# Patient Record
Sex: FEMALE | Race: BLACK OR AFRICAN AMERICAN | Employment: OTHER | URBAN - NONMETROPOLITAN AREA
[De-identification: names, ages, dates, MRNs, and addresses within clinical notes are randomized per-mention and may not be internally consistent; named-entity substitution may affect disease eponyms.]

---

## 2017-05-24 LAB
ALBUMIN SERPL-MCNC: 4.1 G/DL
ALP BLD-CCNC: 31 U/L
ALT SERPL-CCNC: 16 U/L
AST SERPL-CCNC: 18 U/L
BASOPHILS ABSOLUTE: ABNORMAL /ΜL
BASOPHILS RELATIVE PERCENT: ABNORMAL %
BILIRUB SERPL-MCNC: 0.4 MG/DL (ref 0.1–1.4)
BILIRUBIN, URINE: NEGATIVE
BLOOD, URINE: NORMAL
BUN BLDV-MCNC: 19 MG/DL
CALCIUM SERPL-MCNC: 9 MG/DL
CHLORIDE BLD-SCNC: 102 MMOL/L
CHOLESTEROL, TOTAL: 167 MG/DL
CHOLESTEROL/HDL RATIO: 2.8
CLARITY: NORMAL
CO2: 31 MMOL/L
COLOR: YELLOW
CREAT SERPL-MCNC: 0.94 MG/DL
CREATININE, URINE: 126.2
EOSINOPHILS ABSOLUTE: ABNORMAL /ΜL
EOSINOPHILS RELATIVE PERCENT: ABNORMAL %
GFR CALCULATED: >60
GLUCOSE BLD-MCNC: 89 MG/DL
GLUCOSE URINE: NORMAL
HBA1C MFR BLD: 6.6 %
HCT VFR BLD CALC: 52.2 % (ref 36–46)
HDLC SERPL-MCNC: 59 MG/DL (ref 35–70)
HEMOGLOBIN: 16.9 G/DL (ref 12–16)
KETONES, URINE: NEGATIVE
LDL CHOLESTEROL CALCULATED: NORMAL MG/DL (ref 0–160)
LEUKOCYTE ESTERASE, URINE: NEGATIVE
LYMPHOCYTES ABSOLUTE: ABNORMAL /ΜL
LYMPHOCYTES RELATIVE PERCENT: ABNORMAL %
MCH RBC QN AUTO: ABNORMAL PG
MCHC RBC AUTO-ENTMCNC: ABNORMAL G/DL
MCV RBC AUTO: ABNORMAL FL
MICROALBUMIN/CREAT 24H UR: 1951.3 MG/G{CREAT}
MICROALBUMIN/CREAT UR-RTO: 1545.4
MONOCYTES ABSOLUTE: ABNORMAL /ΜL
MONOCYTES RELATIVE PERCENT: ABNORMAL %
NEUTROPHILS ABSOLUTE: ABNORMAL /ΜL
NEUTROPHILS RELATIVE PERCENT: ABNORMAL %
NITRITE, URINE: NEGATIVE
PDW BLD-RTO: ABNORMAL %
PH UA: 6 (ref 4.5–8)
PLATELET # BLD: 180 K/ΜL
PMV BLD AUTO: ABNORMAL FL
POTASSIUM SERPL-SCNC: 3.7 MMOL/L
PROTEIN UA: NORMAL
RBC # BLD: 5.75 10^6/ΜL
SODIUM BLD-SCNC: 139 MMOL/L
SPECIFIC GRAVITY, URINE: 1.02
TOTAL PROTEIN: 7.1
TRIGL SERPL-MCNC: 66 MG/DL
UROBILINOGEN, URINE: NORMAL
VLDLC SERPL CALC-MCNC: 13 MG/DL
WBC # BLD: 4.4 10^3/ML

## 2017-06-21 ENCOUNTER — OFFICE VISIT (OUTPATIENT)
Dept: ENDOCRINOLOGY | Age: 67
End: 2017-06-21

## 2017-06-21 VITALS
RESPIRATION RATE: 16 BRPM | HEIGHT: 59 IN | SYSTOLIC BLOOD PRESSURE: 146 MMHG | DIASTOLIC BLOOD PRESSURE: 88 MMHG | HEART RATE: 84 BPM

## 2017-06-21 DIAGNOSIS — I10 ESSENTIAL HYPERTENSION: ICD-10-CM

## 2017-06-21 DIAGNOSIS — E11.22 TYPE 2 DIABETES MELLITUS WITH STAGE 2 CHRONIC KIDNEY DISEASE, WITH LONG-TERM CURRENT USE OF INSULIN (HCC): Primary | ICD-10-CM

## 2017-06-21 DIAGNOSIS — N18.2 TYPE 2 DIABETES MELLITUS WITH STAGE 2 CHRONIC KIDNEY DISEASE, WITH LONG-TERM CURRENT USE OF INSULIN (HCC): Primary | ICD-10-CM

## 2017-06-21 DIAGNOSIS — Z79.4 TYPE 2 DIABETES MELLITUS WITH STAGE 2 CHRONIC KIDNEY DISEASE, WITH LONG-TERM CURRENT USE OF INSULIN (HCC): Primary | ICD-10-CM

## 2017-06-21 DIAGNOSIS — E78.5 DYSLIPIDEMIA: ICD-10-CM

## 2017-06-21 PROCEDURE — 3017F COLORECTAL CA SCREEN DOC REV: CPT | Performed by: INTERNAL MEDICINE

## 2017-06-21 PROCEDURE — 1036F TOBACCO NON-USER: CPT | Performed by: INTERNAL MEDICINE

## 2017-06-21 PROCEDURE — 3014F SCREEN MAMMO DOC REV: CPT | Performed by: INTERNAL MEDICINE

## 2017-06-21 PROCEDURE — 1090F PRES/ABSN URINE INCON ASSESS: CPT | Performed by: INTERNAL MEDICINE

## 2017-06-21 PROCEDURE — 3046F HEMOGLOBIN A1C LEVEL >9.0%: CPT | Performed by: INTERNAL MEDICINE

## 2017-06-21 PROCEDURE — 1123F ACP DISCUSS/DSCN MKR DOCD: CPT | Performed by: INTERNAL MEDICINE

## 2017-06-21 PROCEDURE — G8427 DOCREV CUR MEDS BY ELIG CLIN: HCPCS | Performed by: INTERNAL MEDICINE

## 2017-06-21 PROCEDURE — 4040F PNEUMOC VAC/ADMIN/RCVD: CPT | Performed by: INTERNAL MEDICINE

## 2017-06-21 PROCEDURE — G8421 BMI NOT CALCULATED: HCPCS | Performed by: INTERNAL MEDICINE

## 2017-06-21 PROCEDURE — 99204 OFFICE O/P NEW MOD 45 MIN: CPT | Performed by: INTERNAL MEDICINE

## 2017-06-21 PROCEDURE — G8400 PT W/DXA NO RESULTS DOC: HCPCS | Performed by: INTERNAL MEDICINE

## 2017-06-21 RX ORDER — ATORVASTATIN CALCIUM 10 MG/1
10 TABLET, FILM COATED ORAL DAILY
Qty: 30 TABLET | Refills: 3 | Status: SHIPPED | OUTPATIENT
Start: 2017-06-21 | End: 2018-03-07 | Stop reason: SDUPTHER

## 2017-06-21 ASSESSMENT — ENCOUNTER SYMPTOMS: BLURRED VISION: 1

## 2017-07-24 ENCOUNTER — TELEPHONE (OUTPATIENT)
Dept: PULMONOLOGY | Age: 67
End: 2017-07-24

## 2017-07-24 DIAGNOSIS — J43.1 PANLOBULAR EMPHYSEMA (HCC): Primary | ICD-10-CM

## 2017-08-29 LAB
ANION GAP SERPL CALCULATED.3IONS-SCNC: 7 MMOL/L (ref 4–12)
BUN BLDV-MCNC: 14 MG/DL (ref 7–20)
CALCIUM SERPL-MCNC: 8.7 MG/DL (ref 8.8–10.5)
CHLORIDE BLD-SCNC: 100 MEQ/L (ref 101–111)
CO2: 34 MEQ/L (ref 21–32)
CREAT SERPL-MCNC: 1.06 MG/DL (ref 0.6–1.3)
CREATININE CLEARANCE: >60
GLUCOSE: 98 MG/DL (ref 70–110)
POTASSIUM SERPL-SCNC: 3.7 MEQ/L (ref 3.6–5)
SODIUM BLD-SCNC: 141 MEQ/L (ref 135–145)

## 2017-09-01 ENCOUNTER — OFFICE VISIT (OUTPATIENT)
Dept: PULMONOLOGY | Age: 67
End: 2017-09-01
Payer: MEDICARE

## 2017-09-01 VITALS
WEIGHT: 200 LBS | HEIGHT: 59 IN | HEART RATE: 70 BPM | TEMPERATURE: 97.4 F | DIASTOLIC BLOOD PRESSURE: 92 MMHG | OXYGEN SATURATION: 92 % | BODY MASS INDEX: 40.32 KG/M2 | SYSTOLIC BLOOD PRESSURE: 144 MMHG

## 2017-09-01 DIAGNOSIS — J43.1 PANLOBULAR EMPHYSEMA (HCC): Primary | ICD-10-CM

## 2017-09-01 PROCEDURE — 1036F TOBACCO NON-USER: CPT | Performed by: INTERNAL MEDICINE

## 2017-09-01 PROCEDURE — G8926 SPIRO NO PERF OR DOC: HCPCS | Performed by: INTERNAL MEDICINE

## 2017-09-01 PROCEDURE — 4040F PNEUMOC VAC/ADMIN/RCVD: CPT | Performed by: INTERNAL MEDICINE

## 2017-09-01 PROCEDURE — 3017F COLORECTAL CA SCREEN DOC REV: CPT | Performed by: INTERNAL MEDICINE

## 2017-09-01 PROCEDURE — 1123F ACP DISCUSS/DSCN MKR DOCD: CPT | Performed by: INTERNAL MEDICINE

## 2017-09-01 PROCEDURE — G8427 DOCREV CUR MEDS BY ELIG CLIN: HCPCS | Performed by: INTERNAL MEDICINE

## 2017-09-01 PROCEDURE — 3014F SCREEN MAMMO DOC REV: CPT | Performed by: INTERNAL MEDICINE

## 2017-09-01 PROCEDURE — G8400 PT W/DXA NO RESULTS DOC: HCPCS | Performed by: INTERNAL MEDICINE

## 2017-09-01 PROCEDURE — 1090F PRES/ABSN URINE INCON ASSESS: CPT | Performed by: INTERNAL MEDICINE

## 2017-09-01 PROCEDURE — 3023F SPIROM DOC REV: CPT | Performed by: INTERNAL MEDICINE

## 2017-09-01 PROCEDURE — 99213 OFFICE O/P EST LOW 20 MIN: CPT | Performed by: INTERNAL MEDICINE

## 2017-09-01 PROCEDURE — G8417 CALC BMI ABV UP PARAM F/U: HCPCS | Performed by: INTERNAL MEDICINE

## 2017-09-01 ASSESSMENT — ENCOUNTER SYMPTOMS
SHORTNESS OF BREATH: 0
COUGH: 0
WHEEZING: 0
APNEA: 0

## 2017-09-05 ENCOUNTER — OFFICE VISIT (OUTPATIENT)
Dept: NEPHROLOGY | Age: 67
End: 2017-09-05
Payer: MEDICARE

## 2017-09-05 VITALS — HEART RATE: 72 BPM | RESPIRATION RATE: 18 BRPM | SYSTOLIC BLOOD PRESSURE: 132 MMHG | DIASTOLIC BLOOD PRESSURE: 90 MMHG

## 2017-09-05 DIAGNOSIS — E11.22 TYPE 2 DIABETES MELLITUS WITH STAGE 2 CHRONIC KIDNEY DISEASE, UNSPECIFIED LONG TERM INSULIN USE STATUS: ICD-10-CM

## 2017-09-05 DIAGNOSIS — N18.2 CKD (CHRONIC KIDNEY DISEASE), STAGE II: Primary | ICD-10-CM

## 2017-09-05 DIAGNOSIS — M15.9 PRIMARY OSTEOARTHRITIS INVOLVING MULTIPLE JOINTS: ICD-10-CM

## 2017-09-05 DIAGNOSIS — R80.1 PERSISTENT PROTEINURIA: ICD-10-CM

## 2017-09-05 DIAGNOSIS — I10 ESSENTIAL HYPERTENSION: ICD-10-CM

## 2017-09-05 DIAGNOSIS — N18.2 TYPE 2 DIABETES MELLITUS WITH STAGE 2 CHRONIC KIDNEY DISEASE, UNSPECIFIED LONG TERM INSULIN USE STATUS: ICD-10-CM

## 2017-09-05 PROCEDURE — G8417 CALC BMI ABV UP PARAM F/U: HCPCS | Performed by: INTERNAL MEDICINE

## 2017-09-05 PROCEDURE — 99213 OFFICE O/P EST LOW 20 MIN: CPT | Performed by: INTERNAL MEDICINE

## 2017-09-05 PROCEDURE — 1036F TOBACCO NON-USER: CPT | Performed by: INTERNAL MEDICINE

## 2017-09-05 PROCEDURE — G8400 PT W/DXA NO RESULTS DOC: HCPCS | Performed by: INTERNAL MEDICINE

## 2017-09-05 PROCEDURE — 3014F SCREEN MAMMO DOC REV: CPT | Performed by: INTERNAL MEDICINE

## 2017-09-05 PROCEDURE — 4040F PNEUMOC VAC/ADMIN/RCVD: CPT | Performed by: INTERNAL MEDICINE

## 2017-09-05 PROCEDURE — 3046F HEMOGLOBIN A1C LEVEL >9.0%: CPT | Performed by: INTERNAL MEDICINE

## 2017-09-05 PROCEDURE — 1090F PRES/ABSN URINE INCON ASSESS: CPT | Performed by: INTERNAL MEDICINE

## 2017-09-05 PROCEDURE — 3017F COLORECTAL CA SCREEN DOC REV: CPT | Performed by: INTERNAL MEDICINE

## 2017-09-05 PROCEDURE — 1123F ACP DISCUSS/DSCN MKR DOCD: CPT | Performed by: INTERNAL MEDICINE

## 2017-09-05 PROCEDURE — G8427 DOCREV CUR MEDS BY ELIG CLIN: HCPCS | Performed by: INTERNAL MEDICINE

## 2017-09-11 RX ORDER — EXENATIDE 250 UG/ML
10 INJECTION SUBCUTANEOUS 2 TIMES DAILY WITH MEALS
Qty: 7.2 ML | Refills: 1 | Status: SHIPPED | OUTPATIENT
Start: 2017-09-11 | End: 2018-03-07 | Stop reason: SDUPTHER

## 2017-09-27 ENCOUNTER — TELEPHONE (OUTPATIENT)
Dept: ENDOCRINOLOGY | Age: 67
End: 2017-09-27

## 2017-11-08 ENCOUNTER — OFFICE VISIT (OUTPATIENT)
Dept: ENDOCRINOLOGY | Age: 67
End: 2017-11-08
Payer: MEDICARE

## 2017-11-08 VITALS
DIASTOLIC BLOOD PRESSURE: 80 MMHG | RESPIRATION RATE: 16 BRPM | BODY MASS INDEX: 38.91 KG/M2 | WEIGHT: 193 LBS | HEIGHT: 59 IN | HEART RATE: 70 BPM | SYSTOLIC BLOOD PRESSURE: 142 MMHG

## 2017-11-08 DIAGNOSIS — Z79.4 TYPE 2 DIABETES MELLITUS WITH HYPERGLYCEMIA, WITH LONG-TERM CURRENT USE OF INSULIN (HCC): Primary | ICD-10-CM

## 2017-11-08 DIAGNOSIS — E11.65 TYPE 2 DIABETES MELLITUS WITH HYPERGLYCEMIA, WITH LONG-TERM CURRENT USE OF INSULIN (HCC): Primary | ICD-10-CM

## 2017-11-08 DIAGNOSIS — E03.9 ACQUIRED HYPOTHYROIDISM: ICD-10-CM

## 2017-11-08 DIAGNOSIS — I10 ESSENTIAL HYPERTENSION: ICD-10-CM

## 2017-11-08 DIAGNOSIS — R80.9 ALBUMINURIA: ICD-10-CM

## 2017-11-08 DIAGNOSIS — E78.00 PURE HYPERCHOLESTEROLEMIA: ICD-10-CM

## 2017-11-08 PROCEDURE — G8427 DOCREV CUR MEDS BY ELIG CLIN: HCPCS | Performed by: INTERNAL MEDICINE

## 2017-11-08 PROCEDURE — 1090F PRES/ABSN URINE INCON ASSESS: CPT | Performed by: INTERNAL MEDICINE

## 2017-11-08 PROCEDURE — G8417 CALC BMI ABV UP PARAM F/U: HCPCS | Performed by: INTERNAL MEDICINE

## 2017-11-08 PROCEDURE — 99214 OFFICE O/P EST MOD 30 MIN: CPT | Performed by: INTERNAL MEDICINE

## 2017-11-08 PROCEDURE — G8484 FLU IMMUNIZE NO ADMIN: HCPCS | Performed by: INTERNAL MEDICINE

## 2017-11-08 PROCEDURE — 1036F TOBACCO NON-USER: CPT | Performed by: INTERNAL MEDICINE

## 2017-11-08 PROCEDURE — G8400 PT W/DXA NO RESULTS DOC: HCPCS | Performed by: INTERNAL MEDICINE

## 2017-11-08 PROCEDURE — 4040F PNEUMOC VAC/ADMIN/RCVD: CPT | Performed by: INTERNAL MEDICINE

## 2017-11-08 PROCEDURE — 3017F COLORECTAL CA SCREEN DOC REV: CPT | Performed by: INTERNAL MEDICINE

## 2017-11-08 PROCEDURE — 3014F SCREEN MAMMO DOC REV: CPT | Performed by: INTERNAL MEDICINE

## 2017-11-08 PROCEDURE — 1123F ACP DISCUSS/DSCN MKR DOCD: CPT | Performed by: INTERNAL MEDICINE

## 2017-11-08 PROCEDURE — 3044F HG A1C LEVEL LT 7.0%: CPT | Performed by: INTERNAL MEDICINE

## 2017-11-08 NOTE — LETTER
Endocrine Diabetes Metabolism [facility]  [address] [facility]  [address]  Phone: [phone]  Fax: [phone]    Marni Winters MD      11/08/17    Dr. Nick Freeman    Patient: Mana Zamora  MR Number: [id number]  YOB: 1950  Date of Visit: 11/8/2017      Dear Dr. Nick Freeman    Thank you for the request for consultation for Mana Zamora to me for the evaluation of   Chief Complaint   Patient presents with    Diabetes     type 2    Foot Problem     denies any    Eye Problem     couple months ago    Other     dyslipidemia    Results     08/29/17   . Below are the relevant portions of my assessment and plan of care. Subjective:     69-year-old female comes for follow-up for type 2 diabetes mellitus, hyperlipidemia and hypertension. Her diabetes has been complicated by albuminuria. She was diagnosed with diabetes mellitus 4 years ago. Current therapy includes metformin, amaryl, byetta and lantus 22 qm. The patient does not keep track of calories but reports that she is on low starch foods. Physical activity is limited due to COPD and bad knees. Weight has declined since the last visit. The patient is checking blood sugar 1x/day . A1c is 6.6. Diabetic symptoms include: polyuria, dry mouth blurry vision due cataract. Most recent eye exam was 2 years The patient reports  no open sores in the feet. She takes 10 mg of Lipitor for cholesterol with no musculoskeletal complaints. Patient is on multiple blood pressure medications including 25 mg of Lopressor, 100 mg cozaar, and diuretics. Is patient is also being treated for hypothyroidism, for which she takes 88 µg of Synthroid. The patient denies cold intolerance, constipation and depression.   Past Medical History:   Diagnosis Date    CKD (chronic kidney disease), stage II 6/13/2013    COPD (chronic obstructive pulmonary disease) (HCC)     Hyperlipidemia     Hypertension     Hypothyroidism  Type II or unspecified type diabetes mellitus without mention of complication, not stated as uncontrolled       Past Surgical History:   Procedure Laterality Date    CARPAL TUNNEL RELEASE Bilateral     ENDOMETRIAL ABLATION         Family History   Problem Relation Age of Onset    Diabetes Mother     Hypertension Mother    [facility] Emphysema Father     Cancer Brother      Social History   Substance Use Topics    Smoking status: Former Smoker     Packs/day: 1.00     Years: 25.00     Types: Cigarettes     Quit date: 9/14/2005    Smokeless tobacco: Never Used    Alcohol use No      Current Outpatient Prescriptions   Medication Sig Dispense Refill    fluticasone-salmeterol (ADVAIR DISKUS) 250-50 MCG/DOSE AEPB USE ONE INHALATION TWO TIMES A DAY 3 each 3    tiotropium (SPIRIVA HANDIHALER) 18 MCG inhalation capsule Inhale 1 capsule into the lungs daily 90 capsule 3    VENTOLIN  (90 Base) MCG/ACT inhaler Inhale 2 puffs into the lungs 4 times daily 3 Inhaler 3    atorvastatin (LIPITOR) 10 MG tablet Take 1 tablet by mouth daily 30 tablet 3    ibuprofen (ADVIL;MOTRIN) 600 MG tablet Take 1 tablet by mouth every 6 hours as needed for Pain 30 tablet 1    cholestyramine light 4 G packet Take 4 g by mouth daily      levothyroxine (SYNTHROID) 88 MCG tablet Take 88 mcg by mouth Daily      Multiple Vitamins-Minerals (THERAPEUTIC MULTIVITAMIN-MINERALS) tablet Take 1 tablet by mouth daily      Cinnamon 500 MG CAPS Take 1 capsule by mouth daily      furosemide (LASIX) 40 MG tablet Take 40 mg by mouth daily      metoprolol tartrate (LOPRESSOR) 25 MG tablet Take 25 mg by mouth daily       insulin glargine (LANTUS) 100 UNIT/ML injection vial Inject 22 Units into the skin daily       hydrochlorothiazide (HYDRODIURIL) 25 MG tablet Take 25 mg by mouth daily.  busPIRone (BUSPAR) 15 MG tablet Take 15 mg by mouth 2 times daily.  glimepiride (AMARYL) 2 MG tablet Take 2 mg by mouth 2 times daily.  metformin (GLUMETZA) 1000 MG (MOD) ER tablet Take 1,000 mg by mouth 2 times daily (with meals).  POTASSIUM CHLORIDE CR PO Take 20 mEq by mouth daily.  losartan (COZAAR) 100 MG tablet Take 100 mg by mouth daily.  BYETTA 10 MCG PEN 10 MCG/0.04ML injection Inject 0.04 mLs into the skin 2 times daily (with meals) 7.2 mL 1    albuterol (PROVENTIL) (2.5 MG/3ML) 0.083% nebulizer solution Take 3 mLs by nebulization every 4 hours as needed for Wheezing or Shortness of Breath 360 vial 3    metolazone (ZAROXOLYN) 5 MG tablet Take 1 tablet by mouth daily for 5 days. 5 tablet 0     No current facility-administered medications for this visit.       Allergies   Allergen Reactions    Nifedipine Er Diarrhea     Health Maintenance   Topic Date Due    Hepatitis C screen  1950    Diabetic foot exam  03/14/1958    Diabetic retinal exam  03/14/1958    DTaP/Tdap/Td vaccine (1 - Tdap) 03/14/1967    DEXA (modify frequency per FRAX score)  03/14/2013    Pneumococcal low/med risk (1 of 2 - PCV13) 03/14/2013    Breast cancer screen  09/17/2016    Flu vaccine (1) 09/01/2017    Diabetic hemoglobin A1C test  05/24/2018    Lipid screen  05/24/2018    Colon cancer screen colonoscopy  05/14/2020    Zostavax vaccine  Addressed       Labs  Lab Results   Component Value Date    LABA1C 6.6 05/24/2017     Lab Results   Component Value Date     06/21/2016     Lab Results   Component Value Date    TSH 1.63 06/21/2016     Lab Results   Component Value Date    CHOL 167 05/24/2017    CHOL 184 08/30/2016    CHOL 184 08/30/2016     Lab Results   Component Value Date    TRIG 66 05/24/2017    TRIG 105 08/30/2016    TRIG 105 08/30/2016     Lab Results   Component Value Date    HDL 59 05/24/2017    HDL 51 08/30/2016    HDL 51 08/30/2016     No results found for: LDLCALC, LDLCHOLESTEROL  Lab Results   Component Value Date    VLDL 13 05/24/2017    VLDL 21 08/30/2016    VLDL 21 08/30/2016     Lab Results Component Value Date    RUSSELL 2.8 05/24/2017         Review of Systems  Constitutional: negative for chills, fatigue and fevers  Eyes: negative for irritation, redness and visual disturbance  Respiratory: negative for cough, shortness of breath and wheezing  Cardiovascular: negative for chest pain, irregular heart beat and palpitations    The remainder of systems were reviewed and negative. Objective:   BP (!) 160/82   Pulse 70   Resp 16   Ht 4' 11.02\" (1.499 m)   Wt 193 lb (87.5 kg)   BMI 38.96 kg/m²      General:  alert, appears stated age, cooperative and no distress   Oropharynx: normal findings: lips normal without lesions, buccal mucosa normal, gums healthy and tongue midline and normal    Eyes:  negative findings: lids and lashes normal, conjunctivae and sclerae normal and pupils equal, round, reactive to light and accomodation. Right eye cataract       Neck: no adenopathy, no carotid bruit, no JVD, supple, symmetrical, trachea midline and thyroid not enlarged, symmetric, no tenderness/mass/nodules   Thyroid:  no palpable nodule   Lung: clear to auscultation bilaterally   Heart:  regular rate and rhythm, S1, S2 normal, no murmur, click, rub or gallop and normal apical impulse   Abdomen: soft, non-tender; bowel sounds normal; no masses,  no organomegaly   Extremities: extremities normal, atraumatic, no cyanosis or edema and Homans sign is negative, no sign of DVT   Pulses: 2+ and symmetric   Skin: warm and dry, no hyperpigmentation, vitiligo, or suspicious lesions   Neuro: normal without focal findings, mental status, speech normal, alert and oriented x3, ARTURO, cranial nerves 2-12 intact, muscle tone and strength normal and symmetric. Feet not examined  Lymph nodes: There is no cervical, supraclavicular or submental adenopathy. Psych: Affect is normal with no depressed mood. Insight is normal with no hallucinations or delusions.   Assessment/Plan: 1. Type 2 diabetes mellitus with hyperglycemia, with long-term current use of insulin ([address]): I cannot fully assess her glycemic control although the limited data seems to suggest that blood sugars had reasonably controlled. She needs to be checking sugars 3 times a day especially since she is on insulin. Unfortunately she cannot exercise much due to bad back and bad knees. Patient is also on home oxygen. 2. Essential hypertension: Uncontrolled but improved in clinic. She is clinically stable. To be followed by PCP. 3. Pure hypercholesterolemia: clinically doing well on statins with no evidence of liver or muscle damage. Goals of therapy were discussed with patient in detail. 4. Acquired hypothyroidism: Current Synthroid dose is well tolerated and effective. We are going to monitor thyroid levels periodically. 5. Albuminuria : Patient is on angiotensin receptor blockade. She follows with nephrology. Orders Placed This Encounter   Procedures    Hemoglobin A1C     Standing Status:   Future     Standing Expiration Date:   11/8/2018       · The risks and benefits of my recommendations, as well as other treatment options were discussed with the patient today. Questions were answered. · Follow up: 3 months and as needed. If you have questions, please do not hesitate to call me. I look forward to following Didier along with you.     Sincerely,        Connie Loya MD

## 2017-11-27 ENCOUNTER — TELEPHONE (OUTPATIENT)
Dept: PULMONOLOGY | Age: 67
End: 2017-11-27

## 2017-11-27 DIAGNOSIS — J41.0 SIMPLE CHRONIC BRONCHITIS (HCC): Primary | ICD-10-CM

## 2017-12-04 ENCOUNTER — OFFICE VISIT (OUTPATIENT)
Dept: PULMONOLOGY | Age: 67
End: 2017-12-04
Payer: MEDICARE

## 2017-12-04 ENCOUNTER — TELEPHONE (OUTPATIENT)
Dept: PULMONOLOGY | Age: 67
End: 2017-12-04

## 2017-12-04 VITALS
BODY MASS INDEX: 39.19 KG/M2 | HEART RATE: 60 BPM | WEIGHT: 194.4 LBS | TEMPERATURE: 97.4 F | SYSTOLIC BLOOD PRESSURE: 136 MMHG | HEIGHT: 59 IN | OXYGEN SATURATION: 91 % | DIASTOLIC BLOOD PRESSURE: 88 MMHG

## 2017-12-04 DIAGNOSIS — J44.1 COPD EXACERBATION (HCC): ICD-10-CM

## 2017-12-04 DIAGNOSIS — J96.11 CHRONIC RESPIRATORY FAILURE WITH HYPOXIA (HCC): ICD-10-CM

## 2017-12-04 DIAGNOSIS — J41.0 SIMPLE CHRONIC BRONCHITIS (HCC): Primary | ICD-10-CM

## 2017-12-04 LAB
ESTIMATED AVERAGE GLUCOSE: 143 MG/DL
HBA1C MFR BLD: 6.6 % (ref 4.4–6.4)

## 2017-12-04 PROCEDURE — 1036F TOBACCO NON-USER: CPT | Performed by: PHYSICIAN ASSISTANT

## 2017-12-04 PROCEDURE — 3014F SCREEN MAMMO DOC REV: CPT | Performed by: PHYSICIAN ASSISTANT

## 2017-12-04 PROCEDURE — 3017F COLORECTAL CA SCREEN DOC REV: CPT | Performed by: PHYSICIAN ASSISTANT

## 2017-12-04 PROCEDURE — 3023F SPIROM DOC REV: CPT | Performed by: PHYSICIAN ASSISTANT

## 2017-12-04 PROCEDURE — 99213 OFFICE O/P EST LOW 20 MIN: CPT | Performed by: PHYSICIAN ASSISTANT

## 2017-12-04 PROCEDURE — G8926 SPIRO NO PERF OR DOC: HCPCS | Performed by: PHYSICIAN ASSISTANT

## 2017-12-04 PROCEDURE — G8427 DOCREV CUR MEDS BY ELIG CLIN: HCPCS | Performed by: PHYSICIAN ASSISTANT

## 2017-12-04 PROCEDURE — G8400 PT W/DXA NO RESULTS DOC: HCPCS | Performed by: PHYSICIAN ASSISTANT

## 2017-12-04 PROCEDURE — G8417 CALC BMI ABV UP PARAM F/U: HCPCS | Performed by: PHYSICIAN ASSISTANT

## 2017-12-04 PROCEDURE — 4040F PNEUMOC VAC/ADMIN/RCVD: CPT | Performed by: PHYSICIAN ASSISTANT

## 2017-12-04 PROCEDURE — 1123F ACP DISCUSS/DSCN MKR DOCD: CPT | Performed by: PHYSICIAN ASSISTANT

## 2017-12-04 PROCEDURE — 1090F PRES/ABSN URINE INCON ASSESS: CPT | Performed by: PHYSICIAN ASSISTANT

## 2017-12-04 PROCEDURE — G8484 FLU IMMUNIZE NO ADMIN: HCPCS | Performed by: PHYSICIAN ASSISTANT

## 2017-12-04 RX ORDER — SOLIFENACIN SUCCINATE 5 MG/1
10 TABLET, FILM COATED ORAL DAILY
COMMUNITY
End: 2018-12-17 | Stop reason: ALTCHOICE

## 2017-12-04 RX ORDER — PREDNISONE 10 MG/1
TABLET ORAL
Qty: 30 TABLET | Refills: 0 | Status: SHIPPED | OUTPATIENT
Start: 2017-12-04 | End: 2017-12-14

## 2017-12-04 RX ORDER — AZITHROMYCIN 250 MG/1
TABLET, FILM COATED ORAL
Qty: 1 PACKET | Refills: 0 | Status: SHIPPED | OUTPATIENT
Start: 2017-12-04 | End: 2017-12-14

## 2017-12-04 ASSESSMENT — ENCOUNTER SYMPTOMS
WHEEZING: 1
NAUSEA: 0
SPUTUM PRODUCTION: 1
SORE THROAT: 0
EYES NEGATIVE: 1
HEARTBURN: 0
VOMITING: 0
BACK PAIN: 0
SHORTNESS OF BREATH: 1
GASTROINTESTINAL NEGATIVE: 1
HEMOPTYSIS: 0
COUGH: 1

## 2017-12-04 NOTE — PROGRESS NOTES
Colesevelam HCl (WELCHOL PO) Take by mouth      solifenacin (VESICARE) 5 MG tablet Take 10 mg by mouth daily      predniSONE (DELTASONE) 10 MG tablet 4 tablets for 3 days, then 3 tablets for 3 days, then 2 tablets for 3 days, then 1 tablet for 3 days 30 tablet 0    azithromycin (ZITHROMAX) 250 MG tablet 2 pills day 1 and 1 pill day 2-5 1 packet 0    BYETTA 10 MCG PEN 10 MCG/0.04ML injection Inject 0.04 mLs into the skin 2 times daily (with meals) 7.2 mL 1    fluticasone-salmeterol (ADVAIR DISKUS) 250-50 MCG/DOSE AEPB USE ONE INHALATION TWO TIMES A DAY 3 each 3    tiotropium (SPIRIVA HANDIHALER) 18 MCG inhalation capsule Inhale 1 capsule into the lungs daily 90 capsule 3    VENTOLIN  (90 Base) MCG/ACT inhaler Inhale 2 puffs into the lungs 4 times daily 3 Inhaler 3    levothyroxine (SYNTHROID) 88 MCG tablet Take 0.05 mcg by mouth Daily       Multiple Vitamins-Minerals (THERAPEUTIC MULTIVITAMIN-MINERALS) tablet Take 1 tablet by mouth daily      Cinnamon 500 MG CAPS Take 1 capsule by mouth daily      furosemide (LASIX) 40 MG tablet Take 40 mg by mouth daily      metoprolol tartrate (LOPRESSOR) 25 MG tablet Take 25 mg by mouth daily       albuterol (PROVENTIL) (2.5 MG/3ML) 0.083% nebulizer solution Take 3 mLs by nebulization every 4 hours as needed for Wheezing or Shortness of Breath 360 vial 3    insulin glargine (LANTUS) 100 UNIT/ML injection vial Inject 22 Units into the skin daily       metolazone (ZAROXOLYN) 5 MG tablet Take 1 tablet by mouth daily for 5 days. (Patient taking differently: Take 2.5 mg by mouth daily ) 5 tablet 0    hydrochlorothiazide (HYDRODIURIL) 25 MG tablet Take 25 mg by mouth daily.  busPIRone (BUSPAR) 15 MG tablet Take 15 mg by mouth 2 times daily.  glimepiride (AMARYL) 2 MG tablet Take 2 mg by mouth 2 times daily.  metformin (GLUMETZA) 1000 MG (MOD) ER tablet Take 1,000 mg by mouth 2 times daily (with meals).       POTASSIUM CHLORIDE CR PO Take 20 mEq by Bowel sounds are normal. She exhibits no distension. There is no tenderness. Musculoskeletal: Normal range of motion. She exhibits no edema or tenderness. Neurological: She is alert and oriented to person, place, and time. Skin: Skin is warm and dry. No rash noted. Psychiatric: Affect and judgment normal.   Nursing note and vitals reviewed. Test results   No new  Assessment     1. Simple chronic bronchitis ([address]  DME Order for Home Oxygen as OP   2. COPD exacerbation ([address]  DME Order for Home Oxygen as OP   3.  Chronic respiratory failure with hypoxia ([address]  DME Order for Home Oxygen as OP         Plan   Renew O2 and eval for portable concentrator  Treat for acute exacerbation with steroids and aerosols  Continue inhalers  Will see Mónica Galan back as scheduled in 9 months    Jessica Pitt PA-C, [place]  12/4/2017

## 2017-12-04 NOTE — TELEPHONE ENCOUNTER
[facility] is calling, you are seeing patient today and they want to confirm with you that it is still ok that patient has a conserving device.

## 2018-03-07 ENCOUNTER — OFFICE VISIT (OUTPATIENT)
Dept: ENDOCRINOLOGY | Age: 68
End: 2018-03-07
Payer: MEDICARE

## 2018-03-07 VITALS
DIASTOLIC BLOOD PRESSURE: 82 MMHG | RESPIRATION RATE: 18 BRPM | BODY MASS INDEX: 37.4 KG/M2 | WEIGHT: 185.5 LBS | HEART RATE: 73 BPM | HEIGHT: 59 IN | SYSTOLIC BLOOD PRESSURE: 140 MMHG

## 2018-03-07 DIAGNOSIS — I10 ESSENTIAL HYPERTENSION: ICD-10-CM

## 2018-03-07 DIAGNOSIS — E78.00 PURE HYPERCHOLESTEROLEMIA: ICD-10-CM

## 2018-03-07 DIAGNOSIS — N18.2 TYPE 2 DIABETES MELLITUS WITH STAGE 2 CHRONIC KIDNEY DISEASE, UNSPECIFIED LONG TERM INSULIN USE STATUS: Primary | ICD-10-CM

## 2018-03-07 DIAGNOSIS — E03.9 ACQUIRED HYPOTHYROIDISM: ICD-10-CM

## 2018-03-07 DIAGNOSIS — E78.5 DYSLIPIDEMIA: ICD-10-CM

## 2018-03-07 DIAGNOSIS — E11.22 TYPE 2 DIABETES MELLITUS WITH STAGE 2 CHRONIC KIDNEY DISEASE, UNSPECIFIED LONG TERM INSULIN USE STATUS: Primary | ICD-10-CM

## 2018-03-07 LAB — HBA1C MFR BLD: 6.4 %

## 2018-03-07 PROCEDURE — 83036 HEMOGLOBIN GLYCOSYLATED A1C: CPT | Performed by: INTERNAL MEDICINE

## 2018-03-07 PROCEDURE — 99214 OFFICE O/P EST MOD 30 MIN: CPT | Performed by: INTERNAL MEDICINE

## 2018-03-07 RX ORDER — LEVOTHYROXINE SODIUM 88 UG/1
88 TABLET ORAL DAILY
Qty: 90 TABLET | Refills: 1 | Status: CANCELLED | OUTPATIENT
Start: 2018-03-07

## 2018-03-07 RX ORDER — EXENATIDE 250 UG/ML
10 INJECTION SUBCUTANEOUS 2 TIMES DAILY WITH MEALS
Qty: 7.2 ML | Refills: 1 | Status: SHIPPED | OUTPATIENT
Start: 2018-03-07 | End: 2019-09-16 | Stop reason: ALTCHOICE

## 2018-03-07 RX ORDER — GLIMEPIRIDE 2 MG/1
2 TABLET ORAL 2 TIMES DAILY
Qty: 180 TABLET | Refills: 1 | Status: SHIPPED | OUTPATIENT
Start: 2018-03-07 | End: 2018-06-26 | Stop reason: ALTCHOICE

## 2018-03-07 RX ORDER — AMMONIUM LACTATE 12 G/100G
LOTION TOPICAL
Qty: 1 BOTTLE | Refills: 1 | Status: SHIPPED | OUTPATIENT
Start: 2018-03-07 | End: 2021-01-01

## 2018-03-07 RX ORDER — ATORVASTATIN CALCIUM 10 MG/1
10 TABLET, FILM COATED ORAL DAILY
Qty: 90 TABLET | Refills: 1 | Status: SHIPPED | OUTPATIENT
Start: 2018-03-07 | End: 2018-12-06 | Stop reason: DRUGHIGH

## 2018-03-07 RX ORDER — INSULIN GLARGINE 100 [IU]/ML
22 INJECTION, SOLUTION SUBCUTANEOUS DAILY
Qty: 2 VIAL | Refills: 1 | Status: SHIPPED | OUTPATIENT
Start: 2018-03-07 | End: 2018-08-16 | Stop reason: SDUPTHER

## 2018-03-07 RX ORDER — LANCETS 30 GAUGE
EACH MISCELLANEOUS
Qty: 300 EACH | Refills: 1 | Status: SHIPPED | OUTPATIENT
Start: 2018-03-07

## 2018-03-07 ASSESSMENT — ENCOUNTER SYMPTOMS
SHORTNESS OF BREATH: 1
TROUBLE SWALLOWING: 0
WHEEZING: 0
NAUSEA: 0
VOMITING: 0
DIARRHEA: 0

## 2018-03-07 NOTE — PROGRESS NOTES
furosemide (LASIX) 40 MG tablet, Take 40 mg by mouth daily, Disp: , Rfl:     metoprolol tartrate (LOPRESSOR) 25 MG tablet, Take 25 mg by mouth daily , Disp: , Rfl:     hydrochlorothiazide (HYDRODIURIL) 25 MG tablet, Take 25 mg by mouth daily. , Disp: , Rfl:     busPIRone (BUSPAR) 15 MG tablet, Take 15 mg by mouth 2 times daily. , Disp: , Rfl:     metformin (GLUMETZA) 1000 MG (MOD) ER tablet, Take 1,000 mg by mouth 2 times daily (with meals). , Disp: , Rfl:     POTASSIUM CHLORIDE CR PO, Take 20 mEq by mouth daily. , Disp: , Rfl:     losartan (COZAAR) 100 MG tablet, Take 100 mg by mouth daily. , Disp: , Rfl:     albuterol (PROVENTIL) (2.5 MG/3ML) 0.083% nebulizer solution, Take 3 mLs by nebulization every 4 hours as needed for Wheezing or Shortness of Breath, Disp: 360 vial, Rfl: 3    metolazone (ZAROXOLYN) 5 MG tablet, Take 1 tablet by mouth daily for 5 days. (Patient taking differently: Take 2.5 mg by mouth daily ), Disp: 5 tablet, Rfl: 0    Allergies: The patient is allergic to nifedipine er. Past Medical History:  Ida Landis  has a past medical history of CKD (chronic kidney disease), stage II; COPD (chronic obstructive pulmonary disease) ([facility] [address] [facility] Hyperlipidemia; Hypertension; Hypothyroidism; and Type II or unspecified type diabetes mellitus without mention of complication, not stated as uncontrolled. Past Surgical History:  The patient  has a past surgical history that includes Carpal tunnel release (Bilateral) and Endometrial ablation. Family History: This patient's family history includes Cancer in her brother; Diabetes in her mother; Emphysema in her father; Hypertension in her mother. Social History:  Ida Landis  reports that she quit smoking about 12 years ago. Her smoking use included Cigarettes. She has a 25.00 pack-year smoking history. She has never used smokeless tobacco. She reports that she does not drink alcohol or use drugs.     Review of Systems:   Review of Systems   Constitutional: sensation [x] Normal  [] Abnormal   Pulses [x] Normal  [] Abnormal   No lesions  Dry feet      ASSESSMENT/PLAN:      1. Type 2 diabetes mellitus with stage 2 chronic kidney disease, unspecified long term insulin use status ([facility])  HbA1c at goal at 6.4%. She is not having any hypoglycemia. Continue current regimen. No changes. Her kidney function is normal so we can continue the metformin. Check Bgs 2-3 times per day  BG Goal  Fasting and premeal  mg/dl ; 2hr pp  < 180 mg/dl  HbA1c goal < 7-7.5 %  Lachydrin for dry feet. - Hemoglobin A1C; Future  - Basic Metabolic Panel; Future  - ammonium lactate (LAC-HYDRIN) 12 % lotion; Apply topically  To feet  Twice daily for dry feet. Dispense: 1 Bottle; Refill: 1    2. Acquired hypothyroidism  Managed by her PCP.  - TSH without Reflex; Future    3. Essential hypertension  Repeat BP at goal.    4. Dyslipidemia  Continue statin. - atorvastatin (LIPITOR) 10 MG tablet; Take 1 tablet by mouth daily  Dispense: 90 tablet; Refill: 1        Orders Placed This Encounter   Procedures    Hemoglobin A1C     Standing Status:   Future     Standing Expiration Date:   3/7/2019    TSH without Reflex     Standing Status:   Future     Standing Expiration Date:   3/7/2019    Basic Metabolic Panel     Standing Status:   Future     Standing Expiration Date:   3/7/2019             Return in about 4 months (around 7/7/2018).

## 2018-05-22 ENCOUNTER — NURSE TRIAGE (OUTPATIENT)
Dept: ADMINISTRATIVE | Age: 68
End: 2018-05-22

## 2018-06-26 ENCOUNTER — OFFICE VISIT (OUTPATIENT)
Dept: NEPHROLOGY | Age: 68
End: 2018-06-26
Payer: MEDICARE

## 2018-06-26 VITALS — SYSTOLIC BLOOD PRESSURE: 111 MMHG | OXYGEN SATURATION: 77 % | HEART RATE: 57 BPM | DIASTOLIC BLOOD PRESSURE: 70 MMHG

## 2018-06-26 DIAGNOSIS — E11.22 TYPE 2 DIABETES MELLITUS WITH STAGE 2 CHRONIC KIDNEY DISEASE, UNSPECIFIED LONG TERM INSULIN USE STATUS: ICD-10-CM

## 2018-06-26 DIAGNOSIS — R80.1 PERSISTENT PROTEINURIA: ICD-10-CM

## 2018-06-26 DIAGNOSIS — N18.2 CKD (CHRONIC KIDNEY DISEASE), STAGE II: Primary | ICD-10-CM

## 2018-06-26 DIAGNOSIS — N18.2 TYPE 2 DIABETES MELLITUS WITH STAGE 2 CHRONIC KIDNEY DISEASE, UNSPECIFIED LONG TERM INSULIN USE STATUS: ICD-10-CM

## 2018-06-26 DIAGNOSIS — I10 ESSENTIAL HYPERTENSION: ICD-10-CM

## 2018-06-26 PROCEDURE — G8400 PT W/DXA NO RESULTS DOC: HCPCS | Performed by: INTERNAL MEDICINE

## 2018-06-26 PROCEDURE — 4040F PNEUMOC VAC/ADMIN/RCVD: CPT | Performed by: INTERNAL MEDICINE

## 2018-06-26 PROCEDURE — 99213 OFFICE O/P EST LOW 20 MIN: CPT | Performed by: INTERNAL MEDICINE

## 2018-06-26 PROCEDURE — G8427 DOCREV CUR MEDS BY ELIG CLIN: HCPCS | Performed by: INTERNAL MEDICINE

## 2018-06-26 PROCEDURE — 1090F PRES/ABSN URINE INCON ASSESS: CPT | Performed by: INTERNAL MEDICINE

## 2018-06-26 PROCEDURE — 3017F COLORECTAL CA SCREEN DOC REV: CPT | Performed by: INTERNAL MEDICINE

## 2018-06-26 PROCEDURE — 2022F DILAT RTA XM EVC RTNOPTHY: CPT | Performed by: INTERNAL MEDICINE

## 2018-06-26 PROCEDURE — 3044F HG A1C LEVEL LT 7.0%: CPT | Performed by: INTERNAL MEDICINE

## 2018-06-26 PROCEDURE — 1123F ACP DISCUSS/DSCN MKR DOCD: CPT | Performed by: INTERNAL MEDICINE

## 2018-06-26 PROCEDURE — G8417 CALC BMI ABV UP PARAM F/U: HCPCS | Performed by: INTERNAL MEDICINE

## 2018-06-26 PROCEDURE — 1036F TOBACCO NON-USER: CPT | Performed by: INTERNAL MEDICINE

## 2018-06-26 RX ORDER — MINOXIDIL 2.5 MG/1
5 TABLET ORAL 2 TIMES DAILY
COMMUNITY
End: 2018-12-06 | Stop reason: ALTCHOICE

## 2018-08-16 RX ORDER — INSULIN GLARGINE 100 [IU]/ML
22 INJECTION, SOLUTION SUBCUTANEOUS DAILY
Qty: 2 VIAL | Refills: 1 | Status: SHIPPED | OUTPATIENT
Start: 2018-08-16

## 2018-11-10 PROBLEM — R04.0 EPISTAXIS: Status: ACTIVE | Noted: 2018-11-10

## 2018-11-11 ENCOUNTER — HOSPITAL ENCOUNTER (OUTPATIENT)
Age: 68
Setting detail: OBSERVATION
Discharge: HOME | End: 2018-11-11
Attending: HOSPITALIST | Admitting: HOSPITALIST
Payer: MEDICARE

## 2018-11-11 VITALS
WEIGHT: 208.56 LBS | TEMPERATURE: 98.2 F | DIASTOLIC BLOOD PRESSURE: 53 MMHG | OXYGEN SATURATION: 88 % | RESPIRATION RATE: 24 BRPM | BODY MASS INDEX: 40.95 KG/M2 | SYSTOLIC BLOOD PRESSURE: 107 MMHG | HEIGHT: 60 IN | HEART RATE: 88 BPM

## 2018-11-11 LAB
GLUCOSE BLD-MCNC: 144 MG/DL (ref 70–108)
GLUCOSE BLD-MCNC: 157 MG/DL (ref 70–108)
GLUCOSE BLD-MCNC: 197 MG/DL (ref 70–108)

## 2018-11-11 PROCEDURE — 99225 PR SBSQ OBSERVATION CARE/DAY 25 MINUTES: CPT | Performed by: FAMILY MEDICINE

## 2018-11-11 PROCEDURE — 6370000000 HC RX 637 (ALT 250 FOR IP): Performed by: HOSPITALIST

## 2018-11-11 PROCEDURE — G0378 HOSPITAL OBSERVATION PER HR: HCPCS

## 2018-11-11 PROCEDURE — 6360000002 HC RX W HCPCS: Performed by: HOSPITALIST

## 2018-11-11 PROCEDURE — 2580000003 HC RX 258: Performed by: HOSPITALIST

## 2018-11-11 PROCEDURE — 82948 REAGENT STRIP/BLOOD GLUCOSE: CPT

## 2018-11-11 PROCEDURE — 94640 AIRWAY INHALATION TREATMENT: CPT

## 2018-11-11 PROCEDURE — 2709999900 HC NON-CHARGEABLE SUPPLY

## 2018-11-11 PROCEDURE — 99221 1ST HOSP IP/OBS SF/LOW 40: CPT | Performed by: OTOLARYNGOLOGY

## 2018-11-11 PROCEDURE — 99217 PR OBSERVATION CARE DISCHARGE MANAGEMENT: CPT | Performed by: FAMILY MEDICINE

## 2018-11-11 PROCEDURE — 99220 PR INITIAL OBSERVATION CARE/DAY 70 MINUTES: CPT | Performed by: HOSPITALIST

## 2018-11-11 PROCEDURE — 2700000000 HC OXYGEN THERAPY PER DAY

## 2018-11-11 RX ORDER — ALBUTEROL SULFATE 90 UG/1
2 AEROSOL, METERED RESPIRATORY (INHALATION) 4 TIMES DAILY
Status: DISCONTINUED | OUTPATIENT
Start: 2018-11-11 | End: 2018-11-11 | Stop reason: HOSPADM

## 2018-11-11 RX ORDER — SPIRONOLACTONE 25 MG/1
25 TABLET ORAL DAILY
COMMUNITY
End: 2018-12-06 | Stop reason: ALTCHOICE

## 2018-11-11 RX ORDER — METOPROLOL TARTRATE 50 MG/1
50 TABLET, FILM COATED ORAL DAILY
Status: DISCONTINUED | OUTPATIENT
Start: 2018-11-11 | End: 2018-11-11 | Stop reason: HOSPADM

## 2018-11-11 RX ORDER — ACETAMINOPHEN 325 MG/1
650 TABLET ORAL EVERY 4 HOURS PRN
Status: DISCONTINUED | OUTPATIENT
Start: 2018-11-11 | End: 2018-11-11 | Stop reason: HOSPADM

## 2018-11-11 RX ORDER — MINOXIDIL 2.5 MG/1
5 TABLET ORAL DAILY
Status: DISCONTINUED | OUTPATIENT
Start: 2018-11-11 | End: 2018-11-11 | Stop reason: HOSPADM

## 2018-11-11 RX ORDER — AMMONIUM LACTATE 12 G/100G
LOTION TOPICAL PRN
Status: DISCONTINUED | OUTPATIENT
Start: 2018-11-11 | End: 2018-11-11 | Stop reason: HOSPADM

## 2018-11-11 RX ORDER — HYDROCHLOROTHIAZIDE 25 MG/1
25 TABLET ORAL DAILY
Status: DISCONTINUED | OUTPATIENT
Start: 2018-11-11 | End: 2018-11-11 | Stop reason: HOSPADM

## 2018-11-11 RX ORDER — FUROSEMIDE 40 MG/1
80 TABLET ORAL 2 TIMES DAILY
Status: DISCONTINUED | OUTPATIENT
Start: 2018-11-11 | End: 2018-11-11 | Stop reason: HOSPADM

## 2018-11-11 RX ORDER — SENNA AND DOCUSATE SODIUM 50; 8.6 MG/1; MG/1
1 TABLET, FILM COATED ORAL 2 TIMES DAILY PRN
Status: DISCONTINUED | OUTPATIENT
Start: 2018-11-11 | End: 2018-11-11 | Stop reason: HOSPADM

## 2018-11-11 RX ORDER — ISOSORBIDE MONONITRATE 30 MG/1
30 TABLET, EXTENDED RELEASE ORAL DAILY
Status: DISCONTINUED | OUTPATIENT
Start: 2018-11-11 | End: 2018-11-11 | Stop reason: HOSPADM

## 2018-11-11 RX ORDER — LEVOTHYROXINE SODIUM 88 UG/1
88 TABLET ORAL DAILY
Status: DISCONTINUED | OUTPATIENT
Start: 2018-11-11 | End: 2018-11-11 | Stop reason: HOSPADM

## 2018-11-11 RX ORDER — AMOXICILLIN 250 MG
1 CAPSULE ORAL 2 TIMES DAILY PRN
COMMUNITY

## 2018-11-11 RX ORDER — ONDANSETRON 2 MG/ML
4 INJECTION INTRAMUSCULAR; INTRAVENOUS EVERY 6 HOURS PRN
Status: DISCONTINUED | OUTPATIENT
Start: 2018-11-11 | End: 2018-11-11 | Stop reason: SDUPTHER

## 2018-11-11 RX ORDER — DEXTROSE MONOHYDRATE 25 G/50ML
12.5 INJECTION, SOLUTION INTRAVENOUS PRN
Status: DISCONTINUED | OUTPATIENT
Start: 2018-11-11 | End: 2018-11-11 | Stop reason: HOSPADM

## 2018-11-11 RX ORDER — SPIRONOLACTONE 25 MG/1
25 TABLET ORAL DAILY
Status: DISCONTINUED | OUTPATIENT
Start: 2018-11-11 | End: 2018-11-11 | Stop reason: HOSPADM

## 2018-11-11 RX ORDER — DEXTROSE MONOHYDRATE 50 MG/ML
100 INJECTION, SOLUTION INTRAVENOUS PRN
Status: DISCONTINUED | OUTPATIENT
Start: 2018-11-11 | End: 2018-11-11 | Stop reason: HOSPADM

## 2018-11-11 RX ORDER — SODIUM CHLORIDE 0.9 % (FLUSH) 0.9 %
10 SYRINGE (ML) INJECTION PRN
Status: DISCONTINUED | OUTPATIENT
Start: 2018-11-11 | End: 2018-11-11 | Stop reason: HOSPADM

## 2018-11-11 RX ORDER — OXYMETAZOLINE HYDROCHLORIDE 0.05 G/100ML
2 SPRAY NASAL 2 TIMES DAILY
Qty: 1 BOTTLE | Refills: 0 | Status: SHIPPED | OUTPATIENT
Start: 2018-11-11 | End: 2018-11-11

## 2018-11-11 RX ORDER — INSULIN GLARGINE 100 [IU]/ML
20 INJECTION, SOLUTION SUBCUTANEOUS DAILY
Status: DISCONTINUED | OUTPATIENT
Start: 2018-11-11 | End: 2018-11-11 | Stop reason: HOSPADM

## 2018-11-11 RX ORDER — IPRATROPIUM BROMIDE AND ALBUTEROL SULFATE 2.5; .5 MG/3ML; MG/3ML
1 SOLUTION RESPIRATORY (INHALATION) EVERY 4 HOURS PRN
Status: DISCONTINUED | OUTPATIENT
Start: 2018-11-11 | End: 2018-11-11 | Stop reason: CLARIF

## 2018-11-11 RX ORDER — ISOSORBIDE MONONITRATE 30 MG/1
30 TABLET, EXTENDED RELEASE ORAL DAILY
COMMUNITY

## 2018-11-11 RX ORDER — TOLTERODINE 4 MG/1
4 CAPSULE, EXTENDED RELEASE ORAL DAILY
COMMUNITY

## 2018-11-11 RX ORDER — OXYMETAZOLINE HYDROCHLORIDE 0.05 G/100ML
2 SPRAY NASAL 2 TIMES DAILY
Qty: 1 BOTTLE | Refills: 0 | Status: SHIPPED | OUTPATIENT
Start: 2018-11-11 | End: 2018-11-16

## 2018-11-11 RX ORDER — ATORVASTATIN CALCIUM 20 MG/1
20 TABLET, FILM COATED ORAL DAILY
Status: DISCONTINUED | OUTPATIENT
Start: 2018-11-11 | End: 2018-11-11 | Stop reason: HOSPADM

## 2018-11-11 RX ORDER — ALBUTEROL SULFATE 2.5 MG/3ML
2.5 SOLUTION RESPIRATORY (INHALATION) EVERY 4 HOURS PRN
Status: DISCONTINUED | OUTPATIENT
Start: 2018-11-11 | End: 2018-11-11 | Stop reason: HOSPADM

## 2018-11-11 RX ORDER — NICOTINE POLACRILEX 4 MG
15 LOZENGE BUCCAL PRN
Status: DISCONTINUED | OUTPATIENT
Start: 2018-11-11 | End: 2018-11-11 | Stop reason: HOSPADM

## 2018-11-11 RX ORDER — SODIUM CHLORIDE 0.9 % (FLUSH) 0.9 %
10 SYRINGE (ML) INJECTION EVERY 12 HOURS SCHEDULED
Status: DISCONTINUED | OUTPATIENT
Start: 2018-11-11 | End: 2018-11-11

## 2018-11-11 RX ORDER — SODIUM CHLORIDE 0.9 % (FLUSH) 0.9 %
10 SYRINGE (ML) INJECTION EVERY 12 HOURS SCHEDULED
Status: DISCONTINUED | OUTPATIENT
Start: 2018-11-11 | End: 2018-11-11 | Stop reason: HOSPADM

## 2018-11-11 RX ORDER — CHOLESTYRAMINE LIGHT 4 G/5.7G
4 POWDER, FOR SUSPENSION ORAL DAILY
Status: DISCONTINUED | OUTPATIENT
Start: 2018-11-11 | End: 2018-11-11 | Stop reason: HOSPADM

## 2018-11-11 RX ORDER — ONDANSETRON 2 MG/ML
4 INJECTION INTRAMUSCULAR; INTRAVENOUS EVERY 6 HOURS PRN
Status: DISCONTINUED | OUTPATIENT
Start: 2018-11-11 | End: 2018-11-11 | Stop reason: HOSPADM

## 2018-11-11 RX ORDER — SODIUM CHLORIDE 0.9 % (FLUSH) 0.9 %
10 SYRINGE (ML) INJECTION PRN
Status: DISCONTINUED | OUTPATIENT
Start: 2018-11-11 | End: 2018-11-11 | Stop reason: SDUPTHER

## 2018-11-11 RX ORDER — HEPARIN SODIUM 5000 [USP'U]/ML
5000 INJECTION, SOLUTION INTRAVENOUS; SUBCUTANEOUS EVERY 8 HOURS SCHEDULED
Status: DISCONTINUED | OUTPATIENT
Start: 2018-11-11 | End: 2018-11-11

## 2018-11-11 RX ADMIN — ROFLUMILAST 500 MCG: 500 TABLET ORAL at 09:43

## 2018-11-11 RX ADMIN — INSULIN GLARGINE 20 UNITS: 100 INJECTION, SOLUTION SUBCUTANEOUS at 09:44

## 2018-11-11 RX ADMIN — ISOSORBIDE MONONITRATE 30 MG: 30 TABLET ORAL at 09:43

## 2018-11-11 RX ADMIN — Medication 2 PUFF: at 09:41

## 2018-11-11 RX ADMIN — ALBUTEROL SULFATE 2.5 MG: 2.5 SOLUTION RESPIRATORY (INHALATION) at 04:12

## 2018-11-11 RX ADMIN — MINOXIDIL 5 MG: 2.5 TABLET ORAL at 09:42

## 2018-11-11 RX ADMIN — BUSPIRONE HYDROCHLORIDE 15 MG: 10 TABLET ORAL at 09:43

## 2018-11-11 RX ADMIN — HYDROCHLOROTHIAZIDE 25 MG: 25 TABLET ORAL at 09:43

## 2018-11-11 RX ADMIN — LEVOTHYROXINE SODIUM 88 MCG: 88 TABLET ORAL at 06:43

## 2018-11-11 RX ADMIN — TIOTROPIUM BROMIDE 18 MCG: 18 CAPSULE ORAL; RESPIRATORY (INHALATION) at 09:41

## 2018-11-11 RX ADMIN — METOPROLOL TARTRATE 50 MG: 50 TABLET, FILM COATED ORAL at 09:42

## 2018-11-11 RX ADMIN — Medication 10 ML: at 09:44

## 2018-11-11 RX ADMIN — ALBUTEROL SULFATE 2.5 MG: 2.5 SOLUTION RESPIRATORY (INHALATION) at 13:24

## 2018-11-11 RX ADMIN — CHOLESTYRAMINE 4 G: 4 POWDER, FOR SUSPENSION ORAL at 09:43

## 2018-11-11 RX ADMIN — SPIRONOLACTONE 25 MG: 25 TABLET ORAL at 09:43

## 2018-11-11 RX ADMIN — SENNOSIDES AND DOCUSATE SODIUM 1 TABLET: 8.6; 5 TABLET ORAL at 13:13

## 2018-11-11 RX ADMIN — Medication 1 UNITS: at 12:51

## 2018-11-11 RX ADMIN — FUROSEMIDE 80 MG: 40 TABLET ORAL at 09:43

## 2018-11-11 RX ADMIN — ATORVASTATIN CALCIUM 20 MG: 20 TABLET, FILM COATED ORAL at 09:43

## 2018-11-11 ASSESSMENT — PAIN SCALES - GENERAL
PAINLEVEL_OUTOF10: 0

## 2018-11-11 NOTE — PROGRESS NOTES
Pt admitted to  6K22 via ambulance. Complaints: Shortness of breath. IV none infusing into the wrist left, condition patent and no redness. IV site free of s/s of infection or infiltration. Vital signs obtained. Assessment and data collection initiated. Two nurse skin assessment performed by *** RN and *** RN. Oriented to room. Policies and procedures for  explained. All questions answered with no further questions at this time. Fall prevention and safety brochure discussed with patient. Bed alarm on. Call light in reach. The best day to schedule a follow up Dr appointment is:  Monday p.m. Prefer afternoon appointments.

## 2018-11-11 NOTE — DISCHARGE SUMMARY
180 05/24/2017       Renal:    Lab Results   Component Value Date     07/19/2018    K 4.5 07/19/2018    CL 97 07/19/2018    CO2 37 07/19/2018    BUN 30 07/19/2018    CREATININE 1.14 07/19/2018    CALCIUM 9.4 07/19/2018         Significant Diagnostic Studies    Radiology:   No orders to display          Consults:     IP CONSULT TO OTOLARYNGOLOGY    Disposition:    [x] Home       [] TCU       [] Rehab       [] Psych       [] SNF       [] [facility]       [] Other-    Condition at Discharge: Stable    Code Status:  Full Code     Patient Instructions:    Discharge lab work: Activity: activity as tolerated  Diet: DIET CARB CONTROL; Low Sodium (2 GM); Daily Fluid Restriction: 1800 ml      Follow-up visits:   Hammad Elizalde MD  [place] [id number]  [phone]               Discharge Medications:      Jessica Dhaliwal   [facility] Medication Instructions [id number]    Printed on:11/11/18 0440   Medication Information                      albuterol (PROVENTIL) (2.5 MG/3ML) 0.083% nebulizer solution  Take 3 mLs by nebulization every 4 hours as needed for Wheezing or Shortness of Breath             ammonium lactate (LAC-HYDRIN) 12 % lotion  Apply topically  To feet  Twice daily for dry feet. atorvastatin (LIPITOR) 10 MG tablet  Take 1 tablet by mouth daily             busPIRone (BUSPAR) 15 MG tablet  Take 15 mg by mouth 2 times daily.              BYETTA 10 MCG PEN 10 MCG/0.04ML injection  Inject 0.04 mLs into the skin 2 times daily (with meals)             cholestyramine light 4 G packet  Take 4 g by mouth daily             Cinnamon 500 MG CAPS  Take 1 capsule by mouth daily             fluticasone-salmeterol (ADVAIR DISKUS) 250-50 MCG/DOSE AEPB  USE ONE INHALATION TWO TIMES A DAY             furosemide (LASIX) 40 MG tablet  Take 80 mg by mouth 2 times daily              glucose blood VI test strips (ONETOUCH VERIO) strip  Check blood sugar 3 x daily (Dx: mouth daily             VENTOLIN  (90 Base) MCG/ACT inhaler  Inhale 2 puffs into the lungs 4 times daily                 Time Spent on discharge is more than 30 minutes in the examination, evaluation, counseling and review of medications and discharge plan. Signed: Thank you Holli Bustos MD for the opportunity to be involved in this patient's care.     Electronically signed by Kenji Lazcano MD on 11/11/2018 at 2:46 PM

## 2018-11-11 NOTE — H&P
tobacco.  ETOH:   reports that she does not drink alcohol. Family History:       Reviewed in detail and negative for CAD, CVA. Positive as follows:    Family History   Problem Relation Age of Onset    Diabetes Mother     Hypertension Mother     Emphysema Father     Cancer Brother        Diet: Carb control       REVIEW OF SYSTEMS:   Pertinent positives as noted in the HPI. All other systems reviewed and negative. PHYSICAL EXAM:    /64   Pulse 93   Temp 97.9 °F (36.6 °C) (Axillary)   Resp 24   Ht 5' (1.524 m)   Wt 208 lb 8.9 oz (94.6 kg)   SpO2 93%   BMI 40.73 kg/m²     General appearance: Morbidly obese, alert, no apparent distress, appears stated age and cooperative. On supplemental o2 via venturi mask  HEENT:  Normal cephalic, atraumatic without obvious deformity. Pupils equal, round, and reactive to light. Extra ocular muscles intact. Conjunctivae/corneas clear. No nasal packing, no active nasal bleed. Neck: Supple, with full range of motion. No jugular venous distention. Trachea midline. Respiratory:  Normal respiratory effort. Markedly diminished air entry bilaterally. No Rales/Wheezes/Rhonchi. Cardiovascular:  Regular rate and rhythm with normal S1/S2 without murmurs, rubs or gallops. Abdomen: Soft, non-tender, non-distended with normal bowel sounds. Musculoskeletal:  No clubbing, cyanosis. Chronic bilateral leg lymphedema with hyperpigmentation noted. Full range of motion without deformity. Skin: Skin color, texture, turgor normal.  No rashes or lesions. Neurologic:  Neurovascularly intact without any focal sensory/motor deficits. Cranial nerves: II-XII intact, grossly non-focal.  Psychiatric:  Alert and oriented, thought content appropriate, normal insight  Capillary Refill: Brisk,< 3 seconds   Peripheral Pulses: +2 palpable, equal bilaterally       Labs:     No results for input(s): WBC, HGB, HCT, PLT in the last 72 hours.   No results for input(s): NA, K, CL, CO2, BUN, CREATININE, CALCIUM, PHOS in the last 72 hours. Invalid input(s): MAGNES  No results for input(s): AST, ALT, BILIDIR, BILITOT, ALKPHOS in the last 72 hours. No results for input(s): INR in the last 72 hours. No results for input(s): [place] Fly in the last 72 hours. Urinalysis:      Lab Results   Component Value Date    NITRU Negative 05/24/2017    WBCUA 0-5 10/06/2014    RBCUA 0-2 10/06/2014    GLUCOSEU Negative 10/06/2014       Intake & Output:  No intake/output data recorded. No intake/output data recorded. Radiology:         No orders to display            DVT prophylaxis: [] Lovenox                                 [x] SCDs                                 [] SQ Heparin                                 [] Encourage ambulation           [] Already on Anticoagulation    Code Status: Prior      PT/OT Eval Status: pending    Disposition:    [] Home       [] TCU       [] Rehab       [] Psych       [x] SNF       [] [facility]       [] Other-    ASSESSMENT:    Active Hospital Problems    Diagnosis Date Noted    Epistaxis [R04.0] 11/10/2018       PLAN:    Epistaxis, right. No active bleeding presently. Consult ENT for further eval    DM 2. SSI. Resume lantus    CKD 2. Monitor renal function    COPD. Not in overt exacerbation. Duoneb prn. Resume albuterol, roflumilast, spiriva    Chronic respiratory failure on 6 L/min supplemental o2. Resume supplemental o2 via venturi mask    TAMY . Resume nocturnal CPAP    HTN, fairly well controlled. Resume imdur, metoprolol, hctz, minoxidil    H/O CHF. Appears compensated. Resume lasix, spironolactone, imdur    HLD. Resume atorvastatin    Hypothyroidism. Resume synthroid    Morbid obesity, likely due to excess calorie intake. BMI 40. Lifestyle modification        Thank you Samantha Patricio MD for the opportunity to be involved in this patient's care.     Electronically signed by Tray Dwyer MD on 11/11/2018 at 2:14 AM

## 2018-11-11 NOTE — PROGRESS NOTES
glucagon (rDNA), dextrose, sodium chloride flush, magnesium hydroxide, ondansetron, acetaminophen, sennosides-docusate sodium, albuterol    No intake or output data in the 24 hours ending 11/11/18 1115    Diet:  DIET CARB CONTROL; Low Sodium (2 GM); Daily Fluid Restriction: 1800 ml    Exam:  BP (!) 122/54   Pulse 95   Temp 97.8 °F (36.6 °C) (Axillary)   Resp 26   Ht 5' (1.524 m)   Wt 208 lb 8.9 oz (94.6 kg)   SpO2 92%   BMI 40.73 kg/m²     General appearance: alert, not in acute distress   HEENT: Pupils equal, round, and reactive to light. Conjunctivae clear. No nose bleeding. No nasal packing. Neck: Supple, with full range of motion. No jugular venous distention. Trachea midline. Respiratory: Markedly diminished air entry bilaterally. No Rales/Wheezes/Rhonchi. Cardiovascular: Regular rate and rhythm with normal S1/S2 without murmurs, rubs or gallops. Abdomen: Soft, non-tender, non-distended with normal bowel sounds. Musculoskeletal: passive and active ROM x 4 extremities. Skin: Skin color, texture, turgor normal.  No rashes or lesions. Neurologic:  Neurovascularly intact without any focal sensory/motor deficits. Cranial nerves: II-XII intact, grossly non-focal.  Exam of extremities: intact peripheral pulses, (+) dry skin, (+) grade 1 bipedal pitting edema up to the level mid legs. No leg or calf tenderness       Labs:   No results for input(s): WBC, HGB, HCT, PLT in the last 72 hours. No results for input(s): NA, K, CL, CO2, BUN, CREATININE, CALCIUM, PHOS in the last 72 hours. Invalid input(s): MAGNES  No results for input(s): AST, ALT, BILIDIR, BILITOT, ALKPHOS in the last 72 hours. No results for input(s): INR in the last 72 hours. No results for input(s): Frann Goes in the last 72 hours.     Urinalysis:      Lab Results   Component Value Date    NITRU Negative 05/24/2017    WBCUA 0-5 10/06/2014    RBCUA 0-2 10/06/2014    GLUCOSEU Negative 10/06/2014       Radiology:  No orders to display           Assessment/Plan: This is a 77 y.o. Female, admitted for epistaxis     1. Epistaxis    -possibly due to trauma after cleaning her nose  -pt does not have epistaxis right now, and doing well  -awaits ENT consult   -monitor    2. DM type 2    A1C on 3/7/18 was 6.4  accu-check fairly controlled  Cont lantus and SSI  -cont accu-check  -carb control diet    3. COPD, stable     Not in exacerbation. Cont O2  Duoneb prn.  cont albuterol, roflumilast, spiriva    4. Chronic respiratory failure on 6 L/min supplemental O2     Cont supplemental O2 via venturi mask    5. TAMY     Cont nocturnal CPAP    6. HTN, controlled. Cont imdur, metoprolol, hctz, minoxidil  VS per protocol    7. H/O CHF, compensated    Cont lasix, spironolactone, imdur    8. Hyperlipidemia    -cont statin    9. Hypothyroidism    -cont synthroid    10. Morbid obesity    BMI 40. Lifestyle modification    11. Peripheral edema, chronic     Cont lasix   monitor       Diet: DIET CARB CONTROL; Low Sodium (2 GM);  Daily Fluid Restriction: 1800 ml    DVT prophylaxis: [] Lovenox                                 [x] SCDs                                 [] SQ Heparin                                 [] Encourage ambulation           [] Already on Anticoagulation     Disposition:    [] Home       [] TCU       [] Rehab       [] Psych       [] SNF       [] [place]       [x] Other- awaits ENT consult    Code Status: Full Code        Electronically signed by Fredis Perera MD on 11/11/2018 at 11:15 AM

## 2018-12-03 LAB
BUN BLDV-MCNC: 96 MG/DL
CALCIUM SERPL-MCNC: 9 MG/DL
CHLORIDE BLD-SCNC: 88 MMOL/L
CO2: 37 MMOL/L
CREAT SERPL-MCNC: 3.29 MG/DL
GFR CALCULATED: 17
GLUCOSE BLD-MCNC: 133 MG/DL
POTASSIUM SERPL-SCNC: 4.4 MMOL/L
SODIUM BLD-SCNC: 134 MMOL/L

## 2018-12-06 ENCOUNTER — OFFICE VISIT (OUTPATIENT)
Dept: NEPHROLOGY | Age: 68
End: 2018-12-06
Payer: MEDICARE

## 2018-12-06 VITALS
SYSTOLIC BLOOD PRESSURE: 96 MMHG | WEIGHT: 218 LBS | DIASTOLIC BLOOD PRESSURE: 59 MMHG | BODY MASS INDEX: 42.58 KG/M2 | HEART RATE: 70 BPM | OXYGEN SATURATION: 99 %

## 2018-12-06 DIAGNOSIS — N18.2 CKD (CHRONIC KIDNEY DISEASE), STAGE II: Primary | ICD-10-CM

## 2018-12-06 DIAGNOSIS — R80.1 PERSISTENT PROTEINURIA: ICD-10-CM

## 2018-12-06 DIAGNOSIS — E11.22 TYPE 2 DIABETES MELLITUS WITH STAGE 2 CHRONIC KIDNEY DISEASE, UNSPECIFIED WHETHER LONG TERM INSULIN USE (HCC): ICD-10-CM

## 2018-12-06 DIAGNOSIS — N18.2 TYPE 2 DIABETES MELLITUS WITH STAGE 2 CHRONIC KIDNEY DISEASE, UNSPECIFIED WHETHER LONG TERM INSULIN USE (HCC): ICD-10-CM

## 2018-12-06 DIAGNOSIS — I10 ESSENTIAL HYPERTENSION: ICD-10-CM

## 2018-12-06 DIAGNOSIS — M15.9 PRIMARY OSTEOARTHRITIS INVOLVING MULTIPLE JOINTS: ICD-10-CM

## 2018-12-06 PROCEDURE — 4040F PNEUMOC VAC/ADMIN/RCVD: CPT | Performed by: INTERNAL MEDICINE

## 2018-12-06 PROCEDURE — 1090F PRES/ABSN URINE INCON ASSESS: CPT | Performed by: INTERNAL MEDICINE

## 2018-12-06 PROCEDURE — 1123F ACP DISCUSS/DSCN MKR DOCD: CPT | Performed by: INTERNAL MEDICINE

## 2018-12-06 PROCEDURE — 2022F DILAT RTA XM EVC RTNOPTHY: CPT | Performed by: INTERNAL MEDICINE

## 2018-12-06 PROCEDURE — G8484 FLU IMMUNIZE NO ADMIN: HCPCS | Performed by: INTERNAL MEDICINE

## 2018-12-06 PROCEDURE — 1101F PT FALLS ASSESS-DOCD LE1/YR: CPT | Performed by: INTERNAL MEDICINE

## 2018-12-06 PROCEDURE — 3017F COLORECTAL CA SCREEN DOC REV: CPT | Performed by: INTERNAL MEDICINE

## 2018-12-06 PROCEDURE — G8400 PT W/DXA NO RESULTS DOC: HCPCS | Performed by: INTERNAL MEDICINE

## 2018-12-06 PROCEDURE — 99214 OFFICE O/P EST MOD 30 MIN: CPT | Performed by: INTERNAL MEDICINE

## 2018-12-06 PROCEDURE — G8427 DOCREV CUR MEDS BY ELIG CLIN: HCPCS | Performed by: INTERNAL MEDICINE

## 2018-12-06 PROCEDURE — 3044F HG A1C LEVEL LT 7.0%: CPT | Performed by: INTERNAL MEDICINE

## 2018-12-06 PROCEDURE — 1036F TOBACCO NON-USER: CPT | Performed by: INTERNAL MEDICINE

## 2018-12-06 PROCEDURE — G8417 CALC BMI ABV UP PARAM F/U: HCPCS | Performed by: INTERNAL MEDICINE

## 2018-12-06 RX ORDER — ATORVASTATIN CALCIUM 20 MG/1
20 TABLET, FILM COATED ORAL DAILY
COMMUNITY

## 2018-12-06 RX ORDER — METOPROLOL TARTRATE 50 MG/1
50 TABLET, FILM COATED ORAL 2 TIMES DAILY
COMMUNITY
End: 2020-03-16

## 2018-12-06 NOTE — PROGRESS NOTES
Renal Progress Note    Assessment and Plan:      Diagnosis Orders   1. CKD (chronic kidney disease), stage II     2. Essential hypertension     3. Type 2 diabetes mellitus with stage 2 chronic kidney disease, unspecified whether long term insulin use ([facility]     4. Primary osteoarthritis involving multiple joints     5. Persistent proteinuria       PLAN:  Reviewed labs with the patient and spouse   They understood   Serum creatinine is increased to 3.29 mg/dl from 1.2 mg/dl  Medications reviewed   Stop aldosterone   Stop metolazone and call if leg swellings get worse  Stop minoxidil  Return visit in 2 weeks with labs       Patient Active Problem List   Diagnosis    [facility]    HTN (hypertension)    DM type 2 (diabetes mellitus, type 2) ([facility]    DJD (degenerative joint disease)    Morbid obesity ([facility]    CKD (chronic kidney disease), stage II    Proteinuria    Metabolic alkalosis    Pure hypercholesterolemia    Acquired hypothyroidism    Epistaxis    Chronic respiratory failure (HCC)       Subjective:   Chief complaint:  Chief Complaint   Patient presents with    Chronic Kidney Disease     Stage III      HPI:This is a follow up visit for Mrs Kristina Cartwright here today for a follow up appointment . Was last seen 6 months ago for chronic kidney disease with serum creatinineof 1.2 mg/dl and now 3.29 mg/dl. Doing well otherwise . Blood pressure runs low at home and has occasional dizziness    ROS:Constitutional: negative  Eyes: negative  Ears, nose, mouth, throat, and face: negative  Respiratory: negative  Cardiovascular: positive for lower extremity edema  Gastrointestinal: negative  Genitourinary:negative  Integument/breast: negative  Hematologic/lymphatic: negative  Musculoskeletal:positive for arthralgias and stiff joints  Neurological: positive for coordination problems and gait problems  Behavioral/Psych: negative  Endocrine: negative  Allergic/Immunologic:

## 2018-12-14 ENCOUNTER — TELEPHONE (OUTPATIENT)
Dept: NEPHROLOGY | Age: 68
End: 2018-12-14

## 2018-12-14 LAB
BUN BLDV-MCNC: 26 MG/DL
CALCIUM SERPL-MCNC: 8.8 MG/DL
CHLORIDE BLD-SCNC: 97 MMOL/L
CO2: 46 MMOL/L
CREAT SERPL-MCNC: 1.07 MG/DL
GFR CALCULATED: >60
GLUCOSE BLD-MCNC: 107 MG/DL
MAGNESIUM: NORMAL MG/DL
POTASSIUM SERPL-SCNC: 4 MMOL/L
SODIUM BLD-SCNC: 146 MMOL/L

## 2018-12-17 ENCOUNTER — OFFICE VISIT (OUTPATIENT)
Dept: NEPHROLOGY | Age: 68
End: 2018-12-17
Payer: MEDICARE

## 2018-12-17 VITALS
OXYGEN SATURATION: 99 % | HEIGHT: 60 IN | WEIGHT: 222 LBS | BODY MASS INDEX: 43.59 KG/M2 | HEART RATE: 75 BPM | SYSTOLIC BLOOD PRESSURE: 112 MMHG | DIASTOLIC BLOOD PRESSURE: 73 MMHG

## 2018-12-17 DIAGNOSIS — N18.4 CKD (CHRONIC KIDNEY DISEASE), STAGE IV (HCC): ICD-10-CM

## 2018-12-17 DIAGNOSIS — R60.0 LEG EDEMA: ICD-10-CM

## 2018-12-17 DIAGNOSIS — N17.9 AKI (ACUTE KIDNEY INJURY) (HCC): Primary | ICD-10-CM

## 2018-12-17 DIAGNOSIS — I10 ESSENTIAL HYPERTENSION: ICD-10-CM

## 2018-12-17 DIAGNOSIS — N18.4 TYPE 2 DIABETES MELLITUS WITH STAGE 4 CHRONIC KIDNEY DISEASE, UNSPECIFIED WHETHER LONG TERM INSULIN USE (HCC): ICD-10-CM

## 2018-12-17 DIAGNOSIS — E11.22 TYPE 2 DIABETES MELLITUS WITH STAGE 4 CHRONIC KIDNEY DISEASE, UNSPECIFIED WHETHER LONG TERM INSULIN USE (HCC): ICD-10-CM

## 2018-12-17 PROCEDURE — 2022F DILAT RTA XM EVC RTNOPTHY: CPT | Performed by: INTERNAL MEDICINE

## 2018-12-17 PROCEDURE — 1101F PT FALLS ASSESS-DOCD LE1/YR: CPT | Performed by: INTERNAL MEDICINE

## 2018-12-17 PROCEDURE — 99214 OFFICE O/P EST MOD 30 MIN: CPT | Performed by: INTERNAL MEDICINE

## 2018-12-17 PROCEDURE — 4040F PNEUMOC VAC/ADMIN/RCVD: CPT | Performed by: INTERNAL MEDICINE

## 2018-12-17 PROCEDURE — 1090F PRES/ABSN URINE INCON ASSESS: CPT | Performed by: INTERNAL MEDICINE

## 2018-12-17 PROCEDURE — G8417 CALC BMI ABV UP PARAM F/U: HCPCS | Performed by: INTERNAL MEDICINE

## 2018-12-17 PROCEDURE — G8427 DOCREV CUR MEDS BY ELIG CLIN: HCPCS | Performed by: INTERNAL MEDICINE

## 2018-12-17 PROCEDURE — G8400 PT W/DXA NO RESULTS DOC: HCPCS | Performed by: INTERNAL MEDICINE

## 2018-12-17 PROCEDURE — 3017F COLORECTAL CA SCREEN DOC REV: CPT | Performed by: INTERNAL MEDICINE

## 2018-12-17 PROCEDURE — 3044F HG A1C LEVEL LT 7.0%: CPT | Performed by: INTERNAL MEDICINE

## 2018-12-17 PROCEDURE — G8484 FLU IMMUNIZE NO ADMIN: HCPCS | Performed by: INTERNAL MEDICINE

## 2018-12-17 PROCEDURE — 1123F ACP DISCUSS/DSCN MKR DOCD: CPT | Performed by: INTERNAL MEDICINE

## 2018-12-17 PROCEDURE — 1036F TOBACCO NON-USER: CPT | Performed by: INTERNAL MEDICINE

## 2018-12-17 RX ORDER — IPRATROPIUM BROMIDE AND ALBUTEROL SULFATE 2.5; .5 MG/3ML; MG/3ML
1 SOLUTION RESPIRATORY (INHALATION) EVERY 4 HOURS
COMMUNITY

## 2018-12-17 RX ORDER — ALBUTEROL SULFATE 2.5 MG/3ML
2.5 SOLUTION RESPIRATORY (INHALATION) EVERY 6 HOURS PRN
COMMUNITY

## 2018-12-17 NOTE — PROGRESS NOTES
Renal Progress Note    Assessment and Plan:      Diagnosis Orders   1. CANDACE (acute kidney injury) ([address]     2. CKD (chronic kidney disease), stage IV ([address]     3. Essential hypertension     4. Type 2 diabetes mellitus with stage 4 chronic kidney disease, unspecified whether long term insulin use (HCC)       PLAN:  Reviewed labs with the patient and spouse from the hospital  Labs done 3 days still pending  Will call with result   Doppler ultrasound bilateral legs for possible DVT per patient's request   Daily weight   Increase furosemide to bid for 2 to 3 days for weight gain of 2 to 3 days in a day  Avoid NSAIDS and list given to the patient   Return visit in 3 months with labs       Patient Active Problem List   Diagnosis    COPD (chronic obstructive pulmonary disease) ([address]    HTN (hypertension)    DM type 2 (diabetes mellitus, type 2) ([address]    DJD (degenerative joint disease)    Morbid obesity ([address]    CKD (chronic kidney disease), stage II    Proteinuria    Metabolic alkalosis    Pure hypercholesterolemia    Acquired hypothyroidism    Epistaxis    Chronic respiratory failure ([address]    CANDACE (acute kidney injury) ([address]    CKD (chronic kidney disease), stage IV ([address]       Subjective:   Chief complaint:  Chief Complaint   Patient presents with    Chronic Kidney Disease     ii      HPI:This is a follow up visit for a follow up visit post hospital discharge. Recently admitted to [facility] for fluid overload and dyspnea. Had lower extremity pain and increased swellings over the weekend The home weight brought in by them shows 9 pounds weight loss since discharge however     ROS:Constitutional: negative  Eyes: negative  Ears, nose, mouth, throat, and face: negative  Respiratory: negative  Cardiovascular: positive for lower extremity edema  Gastrointestinal: negative  Genitourinary:negative  Integument/breast: negative  Hematologic/lymphatic: negative  Musculoskeletal:positive for arthralgias and stiff joints  Neurological: positive for coordination problems and gait problems  Behavioral/Psych: negative  Endocrine: negative  Allergic/Immunologic: negative  Medications:     Current Outpatient Prescriptions   Medication Sig Dispense Refill    ALBUTEROL SULFATE HFA IN Inhale into the lungs 4 times daily      albuterol (PROVENTIL) (2.5 MG/3ML) 0.083% nebulizer solution Take 2.5 mg by nebulization every 6 hours as needed for Wheezing      ipratropium-albuterol (DUONEB) 0.5-2.5 (3) MG/3ML SOLN nebulizer solution Inhale 1 vial into the lungs every 4 hours      metoprolol tartrate (LOPRESSOR) 50 MG tablet Take 50 mg by mouth 2 times daily      atorvastatin (LIPITOR) 20 MG tablet Take 20 mg by mouth daily      insulin lispro (HUMALOG) 100 UNIT/ML injection vial Inject into the skin 3 times daily (before meals) HIGH DOSE CORRECTIVE SCALE      isosorbide mononitrate (IMDUR) 30 MG extended release tablet Take 30 mg by mouth daily      LIRAGLUTIDE SC Inject 0.6 mg into the skin nightly      Roflumilast (DALIRESP) 500 MCG tablet Take 500 mcg by mouth daily Has not started med yet      senna-docusate (PERICOLACE) 8.6-50 MG per tablet Take 1 tablet by mouth 2 times daily as needed for Constipation      tolterodine (DETROL LA) 4 MG extended release capsule Take 4 mg by mouth daily      insulin glargine (LANTUS) 100 UNIT/ML injection vial Inject 22 Units into the skin daily (Patient taking differently: Inject 20 Units into the skin daily ) 2 vial 1    glucose blood VI test strips (ONETOUCH VERIO) strip Check blood sugar 3 x daily (Dx: E11.65) 300 each 1    Lancets MISC Check blood sugar 3 x daily (Dx: E11.65) 300 each 1    ammonium lactate (LAC-HYDRIN) 12 % lotion Apply topically  To feet  Twice daily for dry feet.  1 Bottle 1    levothyroxine (SYNTHROID) 88 MCG tablet Take 88 mcg by mouth Daily       furosemide (LASIX) 40 MG tablet Take 40 mg by mouth daily       busPIRone (BUSPAR) 15 MG tablet Take 15 mg by mouth 2 times daily.  BYETTA 10 MCG PEN 10 MCG/0.04ML injection Inject 0.04 mLs into the skin 2 times daily (with meals) 7.2 mL 1    VENTOLIN  (90 Base) MCG/ACT inhaler Inhale 2 puffs into the lungs 4 times daily 3 Inhaler 3    albuterol (PROVENTIL) (2.5 MG/3ML) 0.083% nebulizer solution Take 3 mLs by nebulization every 4 hours as needed for Wheezing or Shortness of Breath (Patient taking differently: Take 2.5 mg by nebulization 3 times daily as needed for Wheezing or Shortness of Breath ) 360 vial 3     No current facility-administered medications for this visit.         Lab Results:    CBC:   Lab Results   Component Value Date    WBC 4.4 05/24/2017    HGB 16.9 (A) 05/24/2017    HCT 52.2 (A) 05/24/2017    MCV 93.7 09/29/2016     05/24/2017     BMP:    Lab Results   Component Value Date     12/03/2018     07/19/2018     08/29/2017    K 4.4 12/03/2018    K 4.5 07/19/2018    K 3.7 08/29/2017    CL 88 12/03/2018    CL 97 (L) 07/19/2018     (L) 08/29/2017    CO2 37 12/03/2018    CO2 37 (H) 07/19/2018    CO2 34 (H) 08/29/2017    BUN 96 12/03/2018    BUN 30 (H) 07/19/2018    BUN 14 08/29/2017    CREATININE 3.29 12/03/2018    CREATININE 1.14 07/19/2018    CREATININE 1.06 08/29/2017    GLUCOSE 133 12/03/2018    GLUCOSE 121 (H) 07/19/2018    GLUCOSE 98 08/29/2017      Hepatic:   Lab Results   Component Value Date    AST 18 05/24/2017    AST 17 08/30/2016    ALT 16 05/24/2017    ALT 16 08/30/2016    BILITOT 0.4 05/24/2017    BILITOT 1.0 08/30/2016    ALKPHOS 31 05/24/2017    ALKPHOS 33 (L) 08/30/2016     BNP: No results found for: BNP  Lipids:   Lab Results   Component Value Date    CHOL 167 05/24/2017    HDL 59 05/24/2017     INR: No results found for: INR  URINE:   Lab Results   Component Value Date    PROTUR Negative 10/06/2014     Lab Results   Component Value Date    NITRU Negative 05/24/2017    COLORU Yellow 05/24/2017    PHUR 6.0 05/24/2017    WBCUA 0-5 10/06/2014    RBCUA 0-2

## 2018-12-20 ENCOUNTER — TELEPHONE (OUTPATIENT)
Dept: NEPHROLOGY | Age: 68
End: 2018-12-20

## 2019-03-01 LAB
ANION GAP SERPL CALCULATED.3IONS-SCNC: 11 MMOL/L (ref 4–12)
BUN BLDV-MCNC: 26 MG/DL (ref 7–20)
CALCIUM SERPL-MCNC: 9.3 MG/DL (ref 8.8–10.5)
CHLORIDE BLD-SCNC: 94 MEQ/L (ref 101–111)
CO2: 39 MEQ/L (ref 21–32)
CREAT SERPL-MCNC: 1.2 MG/DL (ref 0.6–1.3)
CREATININE CLEARANCE: 54
GLUCOSE: 78 MG/DL (ref 70–110)
PARATHYROID HORMONE INTACT: 91.2 U/ML (ref 12–88)
PHOSPHORUS: 4.1 MG/DL (ref 2.4–4.7)
POTASSIUM SERPL-SCNC: 4.1 MEQ/L (ref 3.6–5)
SODIUM BLD-SCNC: 144 MEQ/L (ref 135–145)
VITAMIN D 25-HYDROXY: 21.11 NG/ML (ref 30–100)

## 2019-03-04 ENCOUNTER — TELEPHONE (OUTPATIENT)
Dept: NEPHROLOGY | Age: 69
End: 2019-03-04

## 2019-03-11 ENCOUNTER — OFFICE VISIT (OUTPATIENT)
Dept: NEPHROLOGY | Age: 69
End: 2019-03-11
Payer: MEDICARE

## 2019-03-11 VITALS — HEART RATE: 66 BPM | SYSTOLIC BLOOD PRESSURE: 104 MMHG | DIASTOLIC BLOOD PRESSURE: 63 MMHG | OXYGEN SATURATION: 94 %

## 2019-03-11 DIAGNOSIS — E08.22 DIABETES MELLITUS DUE TO UNDERLYING CONDITION WITH STAGE 3 CHRONIC KIDNEY DISEASE, UNSPECIFIED WHETHER LONG TERM INSULIN USE: ICD-10-CM

## 2019-03-11 DIAGNOSIS — E55.9 VITAMIN D DEFICIENCY: ICD-10-CM

## 2019-03-11 DIAGNOSIS — N18.3 DIABETES MELLITUS DUE TO UNDERLYING CONDITION WITH STAGE 3 CHRONIC KIDNEY DISEASE, UNSPECIFIED WHETHER LONG TERM INSULIN USE: ICD-10-CM

## 2019-03-11 DIAGNOSIS — I10 ESSENTIAL HYPERTENSION: ICD-10-CM

## 2019-03-11 DIAGNOSIS — N18.30 CKD (CHRONIC KIDNEY DISEASE), STAGE III (HCC): ICD-10-CM

## 2019-03-11 DIAGNOSIS — N17.9 AKI (ACUTE KIDNEY INJURY) (HCC): Primary | ICD-10-CM

## 2019-03-11 PROCEDURE — 2022F DILAT RTA XM EVC RTNOPTHY: CPT | Performed by: INTERNAL MEDICINE

## 2019-03-11 PROCEDURE — G8417 CALC BMI ABV UP PARAM F/U: HCPCS | Performed by: INTERNAL MEDICINE

## 2019-03-11 PROCEDURE — 1101F PT FALLS ASSESS-DOCD LE1/YR: CPT | Performed by: INTERNAL MEDICINE

## 2019-03-11 PROCEDURE — 3017F COLORECTAL CA SCREEN DOC REV: CPT | Performed by: INTERNAL MEDICINE

## 2019-03-11 PROCEDURE — G8427 DOCREV CUR MEDS BY ELIG CLIN: HCPCS | Performed by: INTERNAL MEDICINE

## 2019-03-11 PROCEDURE — G8484 FLU IMMUNIZE NO ADMIN: HCPCS | Performed by: INTERNAL MEDICINE

## 2019-03-11 PROCEDURE — 99213 OFFICE O/P EST LOW 20 MIN: CPT | Performed by: INTERNAL MEDICINE

## 2019-03-11 PROCEDURE — 1036F TOBACCO NON-USER: CPT | Performed by: INTERNAL MEDICINE

## 2019-03-11 PROCEDURE — 4040F PNEUMOC VAC/ADMIN/RCVD: CPT | Performed by: INTERNAL MEDICINE

## 2019-03-11 PROCEDURE — G8400 PT W/DXA NO RESULTS DOC: HCPCS | Performed by: INTERNAL MEDICINE

## 2019-03-11 PROCEDURE — 1123F ACP DISCUSS/DSCN MKR DOCD: CPT | Performed by: INTERNAL MEDICINE

## 2019-03-11 PROCEDURE — 1090F PRES/ABSN URINE INCON ASSESS: CPT | Performed by: INTERNAL MEDICINE

## 2019-09-06 LAB
ANION GAP SERPL CALCULATED.3IONS-SCNC: 9 MMOL/L (ref 4–12)
BUN BLDV-MCNC: 25 MG/DL (ref 7–20)
CALCIUM SERPL-MCNC: 9.6 MG/DL (ref 8.8–10.5)
CHLORIDE BLD-SCNC: 94 MEQ/L (ref 101–111)
CO2: 39 MEQ/L (ref 21–32)
CREAT SERPL-MCNC: 1.37 MG/DL (ref 0.6–1.3)
CREATININE CLEARANCE: 46
ESTIMATED AVERAGE GLUCOSE: 143 MG/DL
GLUCOSE: 76 MG/DL (ref 70–110)
HBA1C MFR BLD: 6.6 % (ref 4.4–6.4)
PARATHYROID HORMONE INTACT: 69.1 U/ML (ref 12–88)
POTASSIUM SERPL-SCNC: 3.9 MEQ/L (ref 3.6–5)
SODIUM BLD-SCNC: 142 MEQ/L (ref 135–145)
VITAMIN D 25-HYDROXY: 74.2 NG/ML (ref 30–100)

## 2019-09-16 ENCOUNTER — OFFICE VISIT (OUTPATIENT)
Dept: NEPHROLOGY | Age: 69
End: 2019-09-16
Payer: MEDICARE

## 2019-09-16 VITALS
BODY MASS INDEX: 37.11 KG/M2 | HEART RATE: 61 BPM | SYSTOLIC BLOOD PRESSURE: 114 MMHG | WEIGHT: 190 LBS | OXYGEN SATURATION: 82 % | DIASTOLIC BLOOD PRESSURE: 74 MMHG

## 2019-09-16 DIAGNOSIS — R80.1 PERSISTENT PROTEINURIA: ICD-10-CM

## 2019-09-16 DIAGNOSIS — M15.9 PRIMARY OSTEOARTHRITIS INVOLVING MULTIPLE JOINTS: ICD-10-CM

## 2019-09-16 DIAGNOSIS — N17.9 AKI (ACUTE KIDNEY INJURY) (HCC): Primary | ICD-10-CM

## 2019-09-16 DIAGNOSIS — N18.3 DIABETES MELLITUS DUE TO UNDERLYING CONDITION WITH STAGE 3 CHRONIC KIDNEY DISEASE, UNSPECIFIED WHETHER LONG TERM INSULIN USE: ICD-10-CM

## 2019-09-16 DIAGNOSIS — E08.22 DIABETES MELLITUS DUE TO UNDERLYING CONDITION WITH STAGE 3 CHRONIC KIDNEY DISEASE, UNSPECIFIED WHETHER LONG TERM INSULIN USE: ICD-10-CM

## 2019-09-16 DIAGNOSIS — E55.9 VITAMIN D DEFICIENCY: ICD-10-CM

## 2019-09-16 DIAGNOSIS — I10 ESSENTIAL HYPERTENSION: ICD-10-CM

## 2019-09-16 DIAGNOSIS — N18.30 CKD (CHRONIC KIDNEY DISEASE), STAGE III (HCC): ICD-10-CM

## 2019-09-16 PROCEDURE — 4040F PNEUMOC VAC/ADMIN/RCVD: CPT | Performed by: INTERNAL MEDICINE

## 2019-09-16 PROCEDURE — 1036F TOBACCO NON-USER: CPT | Performed by: INTERNAL MEDICINE

## 2019-09-16 PROCEDURE — 2022F DILAT RTA XM EVC RTNOPTHY: CPT | Performed by: INTERNAL MEDICINE

## 2019-09-16 PROCEDURE — 1123F ACP DISCUSS/DSCN MKR DOCD: CPT | Performed by: INTERNAL MEDICINE

## 2019-09-16 PROCEDURE — G8427 DOCREV CUR MEDS BY ELIG CLIN: HCPCS | Performed by: INTERNAL MEDICINE

## 2019-09-16 PROCEDURE — G8400 PT W/DXA NO RESULTS DOC: HCPCS | Performed by: INTERNAL MEDICINE

## 2019-09-16 PROCEDURE — 99213 OFFICE O/P EST LOW 20 MIN: CPT | Performed by: INTERNAL MEDICINE

## 2019-09-16 PROCEDURE — 3017F COLORECTAL CA SCREEN DOC REV: CPT | Performed by: INTERNAL MEDICINE

## 2019-09-16 PROCEDURE — 1090F PRES/ABSN URINE INCON ASSESS: CPT | Performed by: INTERNAL MEDICINE

## 2019-09-16 PROCEDURE — G8417 CALC BMI ABV UP PARAM F/U: HCPCS | Performed by: INTERNAL MEDICINE

## 2019-09-16 RX ORDER — BUDESONIDE 0.5 MG/2ML
INHALANT ORAL
Refills: 12 | COMMUNITY
Start: 2019-08-28

## 2020-03-09 LAB
ANION GAP SERPL CALCULATED.3IONS-SCNC: 9 MMOL/L (ref 4–12)
BUN BLDV-MCNC: 19 MG/DL (ref 7–20)
CALCIUM SERPL-MCNC: 9.4 MG/DL (ref 8.8–10.5)
CHLORIDE BLD-SCNC: 99 MEQ/L (ref 101–111)
CO2: 37 MEQ/L (ref 21–32)
CREAT SERPL-MCNC: 1.35 MG/DL (ref 0.6–1.3)
CREATININE CLEARANCE: 47
GLUCOSE: 80 MG/DL (ref 70–110)
PARATHYROID HORMONE INTACT: 80.8 U/ML (ref 12–88)
POTASSIUM SERPL-SCNC: 3.6 MEQ/L (ref 3.6–5)
SODIUM BLD-SCNC: 145 MEQ/L (ref 135–145)
VITAMIN D 25-HYDROXY: 48 NG/ML (ref 30–100)

## 2020-03-16 ENCOUNTER — OFFICE VISIT (OUTPATIENT)
Dept: NEPHROLOGY | Age: 70
End: 2020-03-16
Payer: MEDICARE

## 2020-03-16 ENCOUNTER — TELEPHONE (OUTPATIENT)
Dept: NEPHROLOGY | Age: 70
End: 2020-03-16

## 2020-03-16 VITALS — OXYGEN SATURATION: 88 % | SYSTOLIC BLOOD PRESSURE: 118 MMHG | DIASTOLIC BLOOD PRESSURE: 72 MMHG | HEART RATE: 71 BPM

## 2020-03-16 PROBLEM — N17.9 AKI (ACUTE KIDNEY INJURY) (HCC): Status: RESOLVED | Noted: 2018-12-17 | Resolved: 2020-03-16

## 2020-03-16 PROCEDURE — 1090F PRES/ABSN URINE INCON ASSESS: CPT | Performed by: INTERNAL MEDICINE

## 2020-03-16 PROCEDURE — 4040F PNEUMOC VAC/ADMIN/RCVD: CPT | Performed by: INTERNAL MEDICINE

## 2020-03-16 PROCEDURE — 2022F DILAT RTA XM EVC RTNOPTHY: CPT | Performed by: INTERNAL MEDICINE

## 2020-03-16 PROCEDURE — 3017F COLORECTAL CA SCREEN DOC REV: CPT | Performed by: INTERNAL MEDICINE

## 2020-03-16 PROCEDURE — 1123F ACP DISCUSS/DSCN MKR DOCD: CPT | Performed by: INTERNAL MEDICINE

## 2020-03-16 PROCEDURE — 1036F TOBACCO NON-USER: CPT | Performed by: INTERNAL MEDICINE

## 2020-03-16 PROCEDURE — G8400 PT W/DXA NO RESULTS DOC: HCPCS | Performed by: INTERNAL MEDICINE

## 2020-03-16 PROCEDURE — 99213 OFFICE O/P EST LOW 20 MIN: CPT | Performed by: INTERNAL MEDICINE

## 2020-03-16 PROCEDURE — G8482 FLU IMMUNIZE ORDER/ADMIN: HCPCS | Performed by: INTERNAL MEDICINE

## 2020-03-16 PROCEDURE — G8417 CALC BMI ABV UP PARAM F/U: HCPCS | Performed by: INTERNAL MEDICINE

## 2020-03-16 PROCEDURE — G8427 DOCREV CUR MEDS BY ELIG CLIN: HCPCS | Performed by: INTERNAL MEDICINE

## 2020-03-16 RX ORDER — METOPROLOL TARTRATE 50 MG/1
25 TABLET, FILM COATED ORAL 2 TIMES DAILY
Qty: 180 TABLET | Refills: 3 | Status: SHIPPED | OUTPATIENT
Start: 2020-03-16 | End: 2021-05-03 | Stop reason: SDUPTHER

## 2020-03-16 NOTE — PROGRESS NOTES
by mouth Daily       furosemide (LASIX) 40 MG tablet Take 40 mg by mouth daily       busPIRone (BUSPAR) 15 MG tablet Take 15 mg by mouth 2 times daily. No current facility-administered medications for this visit.         Lab Results:    CBC:   Lab Results   Component Value Date    WBC 6.4 09/06/2019    HGB 12.4 09/06/2019    HCT 37.3 09/06/2019    MCV 93.8 09/06/2019     09/06/2019     BMP:    Lab Results   Component Value Date     03/09/2020     09/06/2019     09/06/2019    K 3.6 03/09/2020    K 3.9 09/06/2019    K 3.9 09/06/2019    CL 99 (L) 03/09/2020    CL 95 (L) 09/06/2019    CL 94 (L) 09/06/2019    CO2 37 (H) 03/09/2020    CO2 42 (H) 09/06/2019    CO2 39 (H) 09/06/2019    BUN 19 03/09/2020    BUN 25 (H) 09/06/2019    BUN 25 (H) 09/06/2019    CREATININE 1.35 (H) 03/09/2020    CREATININE 1.37 (H) 09/06/2019    CREATININE 1.37 (H) 09/06/2019    GLUCOSE 80 03/09/2020    GLUCOSE 77 09/06/2019    GLUCOSE 76 09/06/2019      Hepatic:   Lab Results   Component Value Date    AST 16 09/06/2019    AST 24 03/01/2019    AST 18 05/24/2017    ALT 11 09/06/2019    ALT 19 03/01/2019    ALT 16 05/24/2017    BILITOT 0.4 09/06/2019    BILITOT 0.6 03/01/2019    BILITOT 0.4 05/24/2017    ALKPHOS 40 09/06/2019    ALKPHOS 47 03/01/2019    ALKPHOS 31 05/24/2017     BNP: No results found for: BNP  Lipids:   Lab Results   Component Value Date    CHOL 98 09/06/2019    HDL 44 09/06/2019     INR: No results found for: INR  URINE:   Lab Results   Component Value Date    PROTUR Negative 09/06/2019     Lab Results   Component Value Date    NITRU Negative 09/06/2019    COLORU Yellow 09/06/2019    PHUR 6.0 05/24/2017    WBCUA 0-5 09/06/2019    RBCUA 0-2 09/06/2019    MUCUS Present 09/06/2019    BACTERIA Trace 09/06/2019    LEUKOCYTESUR Negative 09/06/2019    LEUKOCYTESUR Negative 05/24/2017    UROBILINOGEN <2.0 E.U./dL 09/06/2019    BILIRUBINUR Negative 09/06/2019    BILIRUBINUR Negative 05/24/2017    GLUCOSEU Negative 09/06/2019    KETUA Negative 09/06/2019      Microalbumen/Creatinine ratio:  No components found for: RUCREAT    Objective:   Vitals: /72 (Site: Left Upper Arm, Position: Sitting, Cuff Size: Large Adult)   Pulse 71   SpO2 (!) 88%      Constitutional:  Alert, awake, no apparent distress  Skin:normal with no rash or any lesions  HEENT:Pupils are reactive . Throat is clear. Oral mucosa is moist.  Neck:supple with no thyromegaly or bruit   Cardiovascular:  S1, S2 without murmur   Respiratory:  Decreased breath sound  Abdomen: +bowel sound, soft, non tender and no bruit  Ext: No LE edema  Musculoskeletal:Intact  Neuro:Alert, awake and oriented with no obvious focal deficit.   Speech is normal.    Electronically signed by Gabby Ball MD on 3/16/2020 at 9:35 AM

## 2020-07-10 NOTE — PATIENT INSTRUCTIONS
Drugs to Avoid with Chronic Kidney Disease    Non-steroidal anti-inflammatory drugs (NSAIDS)    Potential complication and side effects of NSAIDS include:   Kidney injury and worsening kidney function    Risk of stomach ulcer and intestinal bleeding   Fluid retention and edema   Increased Blood Pressure    Non-Steroidal Anti-Inflammatory Drugs     Aspirin (Anacin, Ascriptin, chari, Bufferin, Ecotrin, Excedrin)   Celecoxib (Celebrex)   Choline and magnesium salicylates (CMT, Trisosal, Trilisate)   Choline salicylate (Arthropan)   Diclofenac (Voltaren, Arthrotec, Cataflam, Cambia, Voltaren-XR, Zipsor)   Diflunisal (Dolobid)   Etodolac (Lodine XL, Lodine)   Famotidine + Ibuprofen (Duexis)   Fenoprofen (Nalfon, Nalfon 200)   Furbiprofen (Asaid)   Ibuprofen (Advil, Motrin, Midol, Nuprin, Genpril, Dolgesic,Profen,, Vicoprofen,Combunox, Actiprofen, Addaprin, Caldolor, Haltran, Q-Profen,Ibren, Menadol, Rufen,Saleto-200, [place],Ultraprin, Uni-Pro,Wal-Profen)   Indomethacin (Indocin, Indomethegan, Indo-Miladys)    Ketoprofen (Orudis  KT, Oruvail, Actron)   Ketorolac (Toradol, Sprix)   Magnesium Sulfate (Arthritab, Chari Select, Zane's pills, Asad, Mobidin, Mobogesic)   Meclofenamate Sodium (Ponstel)   Meloxicam (Mobic)   Naproxen (Aleve, Anaprox, Naprosyn, EC-Naprosyn, Naprelan, All Day Pain Relief, Aflaxen, Anaprox-DS, Midol Extended Relief, Naprelan,Prevacid NapraPac, Naprapac, Vimovo)   Nabumetone (Relafen)   Oxaprozin (Daypro)   Piroxicam (Feldene)   Rofecoxib (Vioxx)   Salsalate (Amigesic, Anaflex 750, Disalcid, Marthritic, Mono-gesic, Salflex, Salsitab)   Sodium salicylate (various generics)   Sulindac (Clinoril)   Tolmetin (Tolectin)   Valdecoxib (Bextra)   Mefenamic Acid (Ponstel)   Famotidine and Ibuprofen (Duexis)   Meclofenamate (Meclomen)    Antibiotics   Bactrim   Gentamycin   Tobramycin   Vancomycin   Tetracycline   Macrobid     Other                  IV contrast ,[email or web address]

## 2020-09-21 ENCOUNTER — OFFICE VISIT (OUTPATIENT)
Dept: NEPHROLOGY | Age: 70
End: 2020-09-21
Payer: MEDICARE

## 2020-09-21 VITALS
DIASTOLIC BLOOD PRESSURE: 73 MMHG | OXYGEN SATURATION: 84 % | TEMPERATURE: 96.4 F | HEART RATE: 66 BPM | SYSTOLIC BLOOD PRESSURE: 126 MMHG

## 2020-09-21 PROCEDURE — 99213 OFFICE O/P EST LOW 20 MIN: CPT | Performed by: INTERNAL MEDICINE

## 2020-09-21 PROCEDURE — G8421 BMI NOT CALCULATED: HCPCS | Performed by: INTERNAL MEDICINE

## 2020-09-21 PROCEDURE — G8427 DOCREV CUR MEDS BY ELIG CLIN: HCPCS | Performed by: INTERNAL MEDICINE

## 2020-09-21 PROCEDURE — 2022F DILAT RTA XM EVC RTNOPTHY: CPT | Performed by: INTERNAL MEDICINE

## 2020-09-21 PROCEDURE — 4040F PNEUMOC VAC/ADMIN/RCVD: CPT | Performed by: INTERNAL MEDICINE

## 2020-09-21 PROCEDURE — 1123F ACP DISCUSS/DSCN MKR DOCD: CPT | Performed by: INTERNAL MEDICINE

## 2020-09-21 PROCEDURE — 1090F PRES/ABSN URINE INCON ASSESS: CPT | Performed by: INTERNAL MEDICINE

## 2020-09-21 PROCEDURE — 1036F TOBACCO NON-USER: CPT | Performed by: INTERNAL MEDICINE

## 2020-09-21 PROCEDURE — G8400 PT W/DXA NO RESULTS DOC: HCPCS | Performed by: INTERNAL MEDICINE

## 2020-09-21 PROCEDURE — 3017F COLORECTAL CA SCREEN DOC REV: CPT | Performed by: INTERNAL MEDICINE

## 2020-09-21 NOTE — PROGRESS NOTES
Renal Progress Note    Assessment and Plan:      Diagnosis Orders   1. CKD (chronic kidney disease), stage III ([facility]     2. Essential hypertension     3. Diabetes mellitus due to underlying condition with stage 3 chronic kidney disease, unspecified whether long term insulin use ([facility]     4. Primary osteoarthritis involving multiple joints     5. Vitamin D deficiency     6. Persistent proteinuria     PLAN:  Lab result discussed with the patient and spouse. Serum creatinine is improved to 1.18 mg/dL from 1.35 mg/dL. Medications reviewed. No changes. Return visit in 12 months not 6 months. Patient Active Problem List   Diagnosis    COPD (chronic obstructive pulmonary disease) ([facility]    HTN (hypertension)    [facility], [facility]    DJD (degenerative joint disease)    Mor[facility]    CKD (chronic kidney disease), stage II    Proteinuria    Metabolic alkalosis    Pure hypercholesterolemia    Acquired hypothyroidism    Epistaxis    Chronic respiratory failure (HCC)    CKD (chronic kidney disease), stage IV (HCC)    Leg edema       Subjective:   Chief complaint:  Chief Complaint   Patient presents with    Chronic Kidney Disease     Stage III      HPI:This is a follow up visit for Mrs. Deborah Higgins who is here today for return appointment. I see her for chronic kidney disease. She was last seen about 6 months ago. Serum creatinine was 1.35 mg/dL. Today it is 1.18 mg/dL. Doing well with no complaint. She brought a record of home blood pressure measurement from home, they are in fact very good. She denies any chest pain or shortness of breath. No nausea vomiting. No fever or chills. She is still mostly wheelchair-bound.     ROS:Constitutional: negative  Eyes: negative  Ears, nose, mouth, throat, and face: negative  Respiratory: negative  Cardiovascular: negative  Gastrointestinal: negative  Genitourinary:negative  Integument/breast: levothyroxine (SYNTHROID) 88 MCG tablet Take 88 mcg by mouth Daily       furosemide (LASIX) 40 MG tablet Take 40 mg by mouth daily       busPIRone (BUSPAR) 15 MG tablet Take 15 mg by mouth 2 times daily. No current facility-administered medications for this visit.         Lab Results:    CBC:   Lab Results   Component Value Date    WBC 4.9 08/21/2020    HGB 11.6 (L) 08/21/2020    HCT 36.3 08/21/2020    MCV 92.9 08/21/2020     08/21/2020     BMP:    Lab Results   Component Value Date     08/21/2020     07/30/2020     03/09/2020    K 4.5 08/21/2020    K 3.7 07/30/2020    K 3.6 03/09/2020    CL 95 (L) 08/21/2020    CL 96 (L) 07/30/2020    CL 99 (L) 03/09/2020    CO2 >45 (H) 08/21/2020    CO2 45 (H) 07/30/2020    CO2 37 (H) 03/09/2020    BUN 24 (H) 08/21/2020    BUN 23 (H) 07/30/2020    BUN 19 03/09/2020    CREATININE 1.18 08/21/2020    CREATININE 1.02 07/30/2020    CREATININE 1.35 (H) 03/09/2020    GLUCOSE 78 08/21/2020    GLUCOSE 189 (H) 07/30/2020    GLUCOSE 80 03/09/2020      Hepatic:   Lab Results   Component Value Date    AST 15 08/21/2020    AST 16 09/06/2019    AST 24 03/01/2019    ALT 10 08/21/2020    ALT 11 09/06/2019    ALT 19 03/01/2019    BILITOT 0.5 08/21/2020    BILITOT 0.4 09/06/2019    BILITOT 0.6 03/01/2019    ALKPHOS 40 08/21/2020    ALKPHOS 40 09/06/2019    ALKPHOS 47 03/01/2019     BNP: No results found for: BNP  Lipids:   Lab Results   Component Value Date    CHOL 107 08/21/2020    HDL 48 08/21/2020     INR: No results found for: INR  URINE:   Lab Results   Component Value Date    PROTUR Negative 08/21/2020     Lab Results   Component Value Date    NITRU Negative 08/21/2020    COLORU Yellow 08/21/2020    PHUR 6.0 05/24/2017    WBCUA 0-5 08/21/2020    RBCUA 0-2 08/21/2020    MUCUS Present 08/21/2020    BACTERIA 4+ 08/21/2020    LEUKOCYTESUR Negative 08/21/2020    LEUKOCYTESUR Negative 05/24/2017    UROBILINOGEN 2.0 E.U./dL 08/21/2020    BILIRUBINUR Negative 08/21/2020 BILIRUBINUR Negative 05/24/2017    GLUCOSEU Negative 08/21/2020    KETUA Negative 08/21/2020      Microalbumen/Creatinine ratio:  No components found for: RUCREAT    Objective:   Vitals: /73 (Site: Left Upper Arm, Position: Sitting, Cuff Size: Large Adult)   Pulse 66   Temp 96.4 °F (35.8 °C)   SpO2 (!) 84%      Constitutional:  Alert, awake, no apparent distress  Skin:normal with no rash or any lesions  HEENT:Pupils are reactive . Throat is clear. Oral mucosa is moist.  Neck:supple with no thyromegaly or bruit   Cardiovascular:  S1, S2 without murmur   Respiratory:  Clear to auscultation with no wheezes or rales  Abdomen: +bowel sound, soft, non tender and no bruit  Ext: No LE edema  Musculoskeletal:Intact  Neuro:Alert, awake and oriented with no obvious focal deficit.   Speech is normal.    Electronically signed by Radha Patel MD on 9/21/2020 at 9:26 AM       Awaiting

## 2021-01-01 ENCOUNTER — OFFICE VISIT (OUTPATIENT)
Dept: NEPHROLOGY | Age: 71
End: 2021-01-01
Payer: MEDICARE

## 2021-01-01 VITALS — HEART RATE: 61 BPM | SYSTOLIC BLOOD PRESSURE: 127 MMHG | OXYGEN SATURATION: 87 % | DIASTOLIC BLOOD PRESSURE: 73 MMHG

## 2021-01-01 DIAGNOSIS — I10 ESSENTIAL HYPERTENSION: ICD-10-CM

## 2021-01-01 DIAGNOSIS — R80.1 PERSISTENT PROTEINURIA: ICD-10-CM

## 2021-01-01 DIAGNOSIS — M15.9 PRIMARY OSTEOARTHRITIS INVOLVING MULTIPLE JOINTS: ICD-10-CM

## 2021-01-01 DIAGNOSIS — E55.9 VITAMIN D DEFICIENCY: ICD-10-CM

## 2021-01-01 DIAGNOSIS — N18.31 STAGE 3A CHRONIC KIDNEY DISEASE (HCC): Primary | ICD-10-CM

## 2021-01-01 DIAGNOSIS — E87.3 METABOLIC ALKALOSIS: ICD-10-CM

## 2021-01-01 LAB
ANION GAP SERPL CALCULATED.3IONS-SCNC: 3 MMOL/L (ref 4–12)
BUN BLDV-MCNC: 21 MG/DL (ref 7–20)
CALCIUM SERPL-MCNC: 9.3 MG/DL (ref 8.8–10.5)
CHLORIDE BLD-SCNC: 97 MEQ/L (ref 101–111)
CO2: 42 MEQ/L (ref 21–32)
CREAT SERPL-MCNC: 0.89 MG/DL (ref 0.6–1.3)
CREATININE CLEARANCE: >60
GLUCOSE: 80 MG/DL (ref 70–110)
PARATHYROID HORMONE INTACT: 84.1 U/ML (ref 12–88)
POTASSIUM SERPL-SCNC: 4 MEQ/L (ref 3.6–5)
SODIUM BLD-SCNC: 142 MEQ/L (ref 135–145)
VITAMIN D 25-HYDROXY: 53.5 NG/ML (ref 30–100)

## 2021-01-01 PROCEDURE — G8400 PT W/DXA NO RESULTS DOC: HCPCS | Performed by: INTERNAL MEDICINE

## 2021-01-01 PROCEDURE — G8427 DOCREV CUR MEDS BY ELIG CLIN: HCPCS | Performed by: INTERNAL MEDICINE

## 2021-01-01 PROCEDURE — 4040F PNEUMOC VAC/ADMIN/RCVD: CPT | Performed by: INTERNAL MEDICINE

## 2021-01-01 PROCEDURE — 1123F ACP DISCUSS/DSCN MKR DOCD: CPT | Performed by: INTERNAL MEDICINE

## 2021-01-01 PROCEDURE — 1090F PRES/ABSN URINE INCON ASSESS: CPT | Performed by: INTERNAL MEDICINE

## 2021-01-01 PROCEDURE — 1036F TOBACCO NON-USER: CPT | Performed by: INTERNAL MEDICINE

## 2021-01-01 PROCEDURE — G8421 BMI NOT CALCULATED: HCPCS | Performed by: INTERNAL MEDICINE

## 2021-01-01 PROCEDURE — 99213 OFFICE O/P EST LOW 20 MIN: CPT | Performed by: INTERNAL MEDICINE

## 2021-01-01 PROCEDURE — 3017F COLORECTAL CA SCREEN DOC REV: CPT | Performed by: INTERNAL MEDICINE

## 2021-01-01 PROCEDURE — G8484 FLU IMMUNIZE NO ADMIN: HCPCS | Performed by: INTERNAL MEDICINE

## 2021-01-01 RX ORDER — METOPROLOL TARTRATE 50 MG/1
TABLET, FILM COATED ORAL
Qty: 45 TABLET | Refills: 3 | Status: SHIPPED | OUTPATIENT
Start: 2021-01-01

## 2021-05-03 RX ORDER — METOPROLOL TARTRATE 50 MG/1
25 TABLET, FILM COATED ORAL 2 TIMES DAILY
Qty: 45 TABLET | Refills: 3 | Status: SHIPPED | OUTPATIENT
Start: 2021-05-03 | End: 2021-01-01

## 2021-10-12 NOTE — PROGRESS NOTES
Renal Progress Note    Assessment and Plan:      Diagnosis Orders   1. Stage 3a chronic kidney disease ([facility] [address]     2. Essential hypertension     3. Primary osteoarthritis involving multiple joints     4. Persistent proteinuria     5. Vitamin D deficiency     6. Metabolic alkalosis       PLAN:   Lab results are reviewed with the patient  They understood   Serum creatinine is good at 0.89 mg/dl  PTH is normal  Vitamin D level is normal  Medications reviewed   No changes   Use furosemide as needed due to metabolic alkalosis. I offered to see her as needed but she declined and wants to come back in 12 months         Patient Active Problem List   Diagnosis    COPD (chronic obstructive pulmonary disease) ([facility] [address]    HTN (hypertension)    DM type 2 (diabetes mellitus, type 2) ([facility] [address]    DJD (degenerative joint disease)    Morbid obesity ([facility] [address]    CKD (chronic kidney disease), stage II    Proteinuria    Metabolic alkalosis    Pure hypercholesterolemia    Acquired hypothyroidism    Epistaxis    Chronic respiratory failure (HCC)    CKD (chronic kidney disease), stage IV ([address]    Leg edema         Subjective:   Chief complaint:  Chief Complaint   Patient presents with    Chronic Kidney Disease     Stage II      HPI:This is a follow up visit for Ms Leona King  here today for a follow up appointment . Sees her for chronic kidney disease . She was last seen about 13 months ago. Doing well since then. She is mostly wheelchair-bound due to abnormal gait. No chest pain. No shortness of breath. No nausea vomiting. No fever chills. ROS:  Pertinent positives stated above in HPI. All other systems were reviewed and were negative.   Medications:     Current Outpatient Medications   Medication Sig Dispense Refill    ipratropium (ATROVENT) 0.02 % nebulizer solution       tiotropium (SPIRIVA) 18 MCG inhalation capsule Inhale 18 mcg into the lungs daily      metoprolol tartrate (LOPRESSOR) 50 MG tablet Take 0.5 tablets by mouth 2 times daily 45 tablet 3    budesonide (PULMICORT) 0.5 MG/2ML nebulizer suspension VVN BID  12    ALBUTEROL SULFATE HFA IN Inhale into the lungs 4 times daily      albuterol (PROVENTIL) (2.5 MG/3ML) 0.083% nebulizer solution Take 2.5 mg by nebulization every 6 hours as needed for Wheezing      ipratropium-albuterol (DUONEB) 0.5-2.5 (3) MG/3ML SOLN nebulizer solution Inhale 1 vial into the lungs every 4 hours      atorvastatin (LIPITOR) 20 MG tablet Take 20 mg by mouth daily      insulin lispro (HUMALOG) 100 UNIT/ML injection vial Inject into the skin 3 times daily (before meals) HIGH DOSE CORRECTIVE SCALE      isosorbide mononitrate (IMDUR) 30 MG extended release tablet Take 30 mg by mouth daily      LIRAGLUTIDE SC Inject 0.6 mg into the skin nightly      senna-docusate (PERICOLACE) 8.6-50 MG per tablet Take 1 tablet by mouth 2 times daily as needed for Constipation      tolterodine (DETROL LA) 4 MG extended release capsule Take 4 mg by mouth daily      insulin glargine (LANTUS) 100 UNIT/ML injection vial Inject 22 Units into the skin daily (Patient taking differently: Inject 18 Units into the skin daily ) 2 vial 1    glucose blood VI test strips (ONETOUCH VERIO) strip Check blood sugar 3 x daily (Dx: E11.65) 300 each 1    Lancets MISC Check blood sugar 3 x daily (Dx: E11.65) 300 each 1    VENTOLIN  (90 Base) MCG/ACT inhaler Inhale 2 puffs into the lungs 4 times daily 3 Inhaler 3    levothyroxine (SYNTHROID) 88 MCG tablet Take 88 mcg by mouth Daily       furosemide (LASIX) 40 MG tablet Take 40 mg by mouth daily       busPIRone (BUSPAR) 15 MG tablet Take 15 mg by mouth 2 times daily. No current facility-administered medications for this visit.        Lab Results:    CBC:   Lab Results   Component Value Date    WBC 5.5 09/17/2021    HGB 11.9 (L) 09/17/2021    HCT 35.7 09/17/2021    MCV 90.7 09/17/2021     09/17/2021     BMP:    Lab Results   Component Value Date  09/17/2021     09/17/2021     08/21/2020    K 4.0 09/17/2021    K 4.0 09/17/2021    K 4.5 08/21/2020    CL 97 (L) 09/17/2021    CL 97 (L) 09/17/2021    CL 95 (L) 08/21/2020    CO2 42 (H) 09/17/2021    CO2 42 (H) 09/17/2021    CO2 >45 (H) 08/21/2020    BUN 21 (H) 09/17/2021    BUN 21 (H) 09/17/2021    BUN 24 (H) 08/21/2020    CREATININE 0.90 09/17/2021    CREATININE 0.89 09/17/2021    CREATININE 1.18 08/21/2020    GLUCOSE 79 09/17/2021    GLUCOSE 80 09/17/2021    GLUCOSE 78 08/21/2020      Hepatic:   Lab Results   Component Value Date    AST 14 (L) 09/17/2021    AST 15 08/21/2020    AST 16 09/06/2019    ALT 9 (L) 09/17/2021    ALT 10 08/21/2020    ALT 11 09/06/2019    BILITOT 0.5 09/17/2021    BILITOT 0.5 08/21/2020    BILITOT 0.4 09/06/2019    ALKPHOS 34 (L) 09/17/2021    ALKPHOS 40 08/21/2020    ALKPHOS 40 09/06/2019     BNP before doing the blood: No results found for: BNP  Lipids:   Lab Results   Component Value Date    CHOL 119 09/17/2021    HDL 50 09/17/2021     INR: No results found for: INR  URINE:   Lab Results   Component Value Date    PROTUR 30 mg/dL 09/17/2021     Lab Results   Component Value Date    NITRU Negative 09/17/2021    COLORU Yellow 09/17/2021    PHUR 6.0 05/24/2017    WBCUA 0-5 09/17/2021    RBCUA None 09/17/2021    MUCUS Present 09/17/2021    BACTERIA Trace 09/17/2021    LEUKOCYTESUR Negative 09/17/2021    LEUKOCYTESUR Negative 05/24/2017    UROBILINOGEN <2.0 E.U./dL 09/17/2021    BILIRUBINUR Negative 09/17/2021    BILIRUBINUR Negative 05/24/2017    GLUCOSEU Negative 09/17/2021    KETUA Negative 09/17/2021      Microalbumen/Creatinine ratio:  No components found for: RUCREAT    Objective:   Vitals: /73 (Site: Right Upper Arm, Position: Sitting, Cuff Size: Large Adult)   Pulse 61   SpO2 (!) 87%      Constitutional:  Alert, awake, no apparent distress  Skin:normal with no rash or any lesions  HEENT:Pupils are reactive . Throat is clear.   Oral mucosa is moist.  Neck:supple with no thyromegaly or bruit   Cardiovascular:  S1, S2 without murmur   Respiratory:  Clear to auscultation with no wheezes or rales  Abdomen: +bowel sound, soft, non tender and no bruit  Ext: No LE edema  Musculoskeletal:Intact  Neuro:Alert, awake and oriented with no obvious focal deficit. Speech is normal    Electronically signed by Yuki Reed MD on 10/12/2021 at 10:02 AM   **This report has been created using voice recognition software. It maycontain minor  errors which are inherent in voice recognition technology. **